# Patient Record
Sex: MALE | Race: WHITE | NOT HISPANIC OR LATINO | Employment: UNEMPLOYED | URBAN - NONMETROPOLITAN AREA
[De-identification: names, ages, dates, MRNs, and addresses within clinical notes are randomized per-mention and may not be internally consistent; named-entity substitution may affect disease eponyms.]

---

## 2017-04-28 ENCOUNTER — APPOINTMENT (OUTPATIENT)
Dept: OCCUPATIONAL MEDICINE | Facility: OTHER | Age: 57
End: 2017-04-28

## 2017-04-28 PROCEDURE — 99000 SPECIMEN HANDLING OFFICE-LAB: CPT

## 2018-02-13 ENCOUNTER — TRANSFERRED RECORDS (OUTPATIENT)
Dept: HEALTH INFORMATION MANAGEMENT | Facility: HOSPITAL | Age: 58
End: 2018-02-13

## 2018-02-14 RX ORDER — FLUTICASONE PROPIONATE 50 MCG
2 SPRAY, SUSPENSION (ML) NASAL DAILY
COMMUNITY
End: 2018-03-16

## 2018-02-15 DIAGNOSIS — H91.93 DECREASED HEARING OF BOTH EARS: Primary | ICD-10-CM

## 2018-02-16 ENCOUNTER — OFFICE VISIT (OUTPATIENT)
Dept: OTOLARYNGOLOGY | Facility: OTHER | Age: 58
End: 2018-02-16
Attending: AUDIOLOGIST
Payer: COMMERCIAL

## 2018-02-16 ENCOUNTER — OFFICE VISIT (OUTPATIENT)
Dept: AUDIOLOGY | Facility: OTHER | Age: 58
End: 2018-02-16
Attending: AUDIOLOGIST
Payer: COMMERCIAL

## 2018-02-16 VITALS
OXYGEN SATURATION: 100 % | TEMPERATURE: 97.4 F | HEIGHT: 66 IN | BODY MASS INDEX: 27.97 KG/M2 | SYSTOLIC BLOOD PRESSURE: 126 MMHG | DIASTOLIC BLOOD PRESSURE: 74 MMHG | WEIGHT: 174 LBS | HEART RATE: 69 BPM

## 2018-02-16 DIAGNOSIS — H90.3 SENSORINEURAL HEARING LOSS, BILATERAL: ICD-10-CM

## 2018-02-16 DIAGNOSIS — J34.89 NASAL SEPTAL SPUR: ICD-10-CM

## 2018-02-16 DIAGNOSIS — H93.13 TINNITUS OF BOTH EARS: ICD-10-CM

## 2018-02-16 DIAGNOSIS — H93.8X3 SENSATION OF FULLNESS IN BOTH EARS: Primary | ICD-10-CM

## 2018-02-16 PROCEDURE — 92504 EAR MICROSCOPY EXAMINATION: CPT | Performed by: PHYSICIAN ASSISTANT

## 2018-02-16 PROCEDURE — 92567 TYMPANOMETRY: CPT | Performed by: AUDIOLOGIST

## 2018-02-16 PROCEDURE — 31231 NASAL ENDOSCOPY DX: CPT | Performed by: PHYSICIAN ASSISTANT

## 2018-02-16 PROCEDURE — 99213 OFFICE O/P EST LOW 20 MIN: CPT | Mod: 25 | Performed by: PHYSICIAN ASSISTANT

## 2018-02-16 PROCEDURE — 92557 COMPREHENSIVE HEARING TEST: CPT | Performed by: AUDIOLOGIST

## 2018-02-16 ASSESSMENT — PAIN SCALES - GENERAL: PAINLEVEL: SEVERE PAIN (6)

## 2018-02-16 NOTE — LETTER
2/16/2018         RE: Zhen Warner  [address] [place] [address]        Dear Colleague,    Thank you for referring your patient, Zhen Warner, to the [facility]. Please see a copy of my visit note below.    Chief Complaint   Patient presents with     Consult     Ear Pain and Fullness; Referred by Milton Sterling     Zhen has no mabel otalgia, but reports pressure. He has intermittent pressure for a few days, would pass, then persist for longer. He has headphones at work, and notes he has increase pressure with headset. He describes vice like symptoms, fullness in ears.   He recently started Flonase, and noticed some improvement since starting.     He has fluttering in his ears, bilateral. He has constant irritation.   No concerns with hearing.   Tinnitus is worsening w/ plugged feeling. Symptoms several years ago similar about 10-12 years ago. He felt symptoms subsided on its own.   Zhen has no otalgia.   Denies otalgia. C/o aural fullness. Left > Right.   No otorrhea.   No recent OM. No COM. No otologic surgeries.   No vertigo.   No facial pain, pressure. No concerns with rhinitis, post nasal drainage.   No seasonal changes.   No concerns reflux, heartburn.   He did in past, tried to lower sodium in his diet.     No tension headaches. NO headaches. Hx of concussion, 3 years ago.     Patient was seen in past by Dr. lBanc, reduced sodium intake and symptoms resolved.       Reviewed audiogram with pateint  Type A tympanograms.   Thresholds are overall stable. Slight decrease at 4 kHz, mild.     Past Medical History:   Diagnosis Date     Medial epicondylitis of left elbow      Mononucleosis     at age 33     Other and unspecified hyperlipidemia      Pain in joint, upper arm      Renal calculi      Right lateral epicondylitis       No Known Allergies    Current Outpatient Prescriptions   Medication     fluticasone (FLONASE) 50 MCG/ACT spray     Zolpidem Tartrate (AMBIEN  "PO)     Simvastatin (ZOCOR PO)     Multiple Minerals-Vitamins TABS     aspirin 81 MG tablet     No current facility-administered medications for this visit.         ROS: 10 point ROS neg other than the symptoms noted above in the HPI.      Physical Exam  /74 (Cuff Size: Adult Regular)  Pulse 69  Temp 97.4  F (36.3  C) (Tympanic)  Ht 5' 6\" (1.676 m)  Wt 174 lb (78.9 kg)  SpO2 100%  BMI 28.08 kg/m2  General - The patient is well nourished and well developed, and appears to have good nutritional status.  Alert and oriented to person and place, answers questions and cooperates with examination appropriately.   Head and Face - Normocephalic and atraumatic, with no gross asymmetry noted.  The facial nerve is intact, with strong symmetric movements.  Voice and Breathing - The patient was breathing comfortably without the use of accessory muscles. There was no wheezing, stridor, or stertor.  The patients voice was clear and strong, and had appropriate pitch and quality.  Ears -The external auditory canals are patent, the tympanic membranes are intact without effusion, retraction or mass.  Bony landmarks are intact. Ears examined under microscopy. No effusion.   Eyes - Extraocular movements intact, and the pupils were reactive to light.  Sclera were not icteric or injected, conjunctiva were pink and moist.  Mouth - Examination of the oral cavity showed pink, healthy oral mucosa. No lesions or ulcerations noted.  The tongue was mobile and midline, and the dentition were in good condition.    Throat - The walls of the oropharynx were smooth, pink, moist, symmetric, and had no lesions or ulcerations.  The tonsillar pillars and soft palate were symmetric.  The uvula was midline on elevation.    Neck - Normal midline excursion of the laryngotracheal complex during swallowing.  Full range of motion on passive movement.  Palpation of the occipital, submental, submandibular, internal jugular chain, and supraclavicular " nodes did not demonstrate any abnormal lymph nodes or masses.  Palpation of the thyroid was soft and smooth, with no nodules or goiter appreciated.  The trachea was mobile and midline.  Nose - External contour is symmetric, no gross deflection or scars.  Nasal mucosa is pink and moist with no abnormal mucus.  The septum and turbinates were evaluated: DNS, Septal spur      To further evaluate the nasal cavity, I performed rigid nasal endoscopy.  I first sprayed the nasal cavity bilaterally with a mix of lidocaine and neosynephrine.  I then began on the left side using a 2.7mm, 30 degree rigid nasal endoscope.  The septum was deviated, large nasal septal spur with contact along MT.   No abnormal secretions, purulence, or polyps were noted. The left middle turbinate and middle meatus were clearly visualized and normal in appearance.  Looking up, the olfactory cleft was unobstructed.  Going further back, the sphenoethmoid recess was normal in appearance, with healthy appearing mucosa on the face of the sphenoid.  The nasopharynx was unremarkable, and the eustachian tube opening on this side was unobstructed.    I then turned my attention to the right side.    No abnormal secretions, purulence, polyps were noted.  The right middle turbinate and middle meatus were clearly visualized and normal in appearance.  Looking up, the olfactory cleft was unobstructed.  Going further back the right sphenoethmoid recess was normal in appearance, and eustachian tube opening was unobstructed.  ET appear patent bilaterally.       ASSESSMENT:      ICD-10-CM    1. Sensation of fullness in both ears H93.8X3     vs ETD   2. Tinnitus of both ears H93.13    3. Nasal septal spur J34.89      Increase Flonase 2 sprays to each nostril twice a day for 2 weeks, then use daily  Monitor sodium daily.   Lower sodium intake.     He had improvement following a low sodium diet in past with aural fullness.   Differential, ETD vs. Cochlear Meniere's.      Consider allergy testing?     He agrees with this plan  Recheck in 1 month. May consider ET dilation However he has no retraction, effusion, etc on exam.       Deborah Salgado PA-C  [facility] [place]  [phone]                    Again, thank you for allowing me to participate in the care of your patient.        Sincerely,        Deborah Salgado PA-C

## 2018-02-16 NOTE — MR AVS SNAPSHOT
After Visit Summary   2/16/2018    Zhen Warner    MRN: [id number]           Patient Information     Date Of Birth          1960        Visit Information        Provider Department      2/16/2018 11:30 AM Deborah Salgado PA-C [facility]        Care Instructions    Increase Flonase 2 sprays to each nostril twice a day for 2 weeks, then use daily  Monitor sodium daily.   Lower sodium intake.   Education material provided.       Thank you for allowing APRYL Ferrer and our ENT team to participate in your care.  If your medications are too expensive, please give the nurse a call.  We can possibly change this medication.  If you have a scheduling or an appointment question please contact [facility] Unit Coordinator at their direct line [phone].   ALL nursing questions or concerns can be directed to your ENT nurse at: [phone] [facility]              Follow-ups after your visit        Follow-up notes from your care team     Return in about 4 weeks (around 3/16/2018).      Who to contact     If you have questions or need follow up information about today's clinic visit or your schedule please contact [facility] directly at [phone].  Normal or non-critical lab and imaging results will be communicated to you by MyChart, letter or phone within 4 business days after the clinic has received the results. If you do not hear from us within 7 days, please contact the clinic through [employer] or phone. If you have a critical or abnormal lab result, we will notify you by phone as soon as possible.  Submit refill requests through [employer] or call your pharmacy and they will forward the refill request to us. Please allow 3 business days for your refill to be completed.          Additional Information About Your Visit        MyChart Information     [employer] lets you send messages to your doctor, view your test results, renew your prescriptions, schedule appointments and more. To  "sign up, go to [email or web address] . Click on \"Log in\" on the left side of the screen, which will take you to the Welcome page. Then click on \"Sign up Now\" on the right side of the page.     You will be asked to enter the access code listed below, as well as some personal information. Please follow the directions to create your username and password.     Your access code is: [email or web address]  Expires: 2018  8:27 AM     Your access code will  in 90 days. If you need help or a new code, please call your [place] clinic or [phone].        Care EveryWhere ID     This is your Care EveryWhere ID. This could be used by other organizations to access your [facility] medical records  [id number]        Your Vitals Were     Pulse Temperature Height Pulse Oximetry BMI (Body Mass Index)       69 97.4  F (36.3  C) (Tympanic) 5' 6\" (1.676 m) 100% 28.08 kg/m2        Blood Pressure from Last 3 Encounters:   18 126/74   14 128/72   14 112/70    Weight from Last 3 Encounters:   18 174 lb (78.9 kg)   14 158 lb (71.7 kg)   14 158 lb (71.7 kg)              Today, you had the following     No orders found for display       Primary Care Provider Office Phone # Fax #    Milton Sterling [phone]       [facility] [address] [place] [address]        Equal Access to Services     [facility]: Hadii carla warren hadasho Somahendraali, waaxda luqadaha, qaybta kaalmada tej de la rosa . So [facility] [phone].    ATENCIÓN: Si habla español, tiene a martinez disposición servicios gratuitos de asistencia lingüística. Llame al [phone].    We comply with applicable federal civil rights laws and [place] laws. We do not discriminate on the basis of race, color, national origin, age, disability, sex, sexual orientation, or gender identity.            Thank you!     Thank you for choosing [facility]  for your [facility]. Our goal is always " to provide you with excellent care. Hearing back from our patients is one way we can continue to improve our services. Please take a few minutes to complete the written survey that you may receive in the mail after your visit with us. Thank you!             Your Updated Medication List - Protect others around you: Learn how to safely use, store and throw away your medicines at www.disposemymeds.org.          This list is accurate as of 2/16/18 11:45 AM.  Always use your most recent med list.                   Brand Name Dispense Instructions for use Diagnosis    AMBIEN PO      Take 10 mg by mouth At Bedtime Patient takes 1/2 tab        aspirin 81 MG tablet      Take 81 mg by mouth daily        fluticasone 50 MCG/ACT spray    FLONASE     Spray 2 sprays into both nostrils daily        Multiple Minerals-Vitamins Tabs      Take by mouth daily        ZOCOR PO      Take 40 mg by mouth daily

## 2018-02-16 NOTE — NURSING NOTE
"Chief Complaint   Patient presents with     Consult     Ear Pain and Fullness; Referred by Milton Sterling       Initial /74 (Cuff Size: Adult Regular)  Pulse 69  Temp 97.4  F (36.3  C) (Tympanic)  Ht 5' 6\" (1.676 m)  Wt 174 lb (78.9 kg)  SpO2 100%  BMI 28.08 kg/m2 Estimated body mass index is 28.08 kg/(m^2) as calculated from the following:    Height as of this encounter: 5' 6\" (1.676 m).    Weight as of this encounter: 174 lb (78.9 kg).  Medication Reconciliation: complete   Erna España      "
#[id number] and #[id number] scopes used.  
unsure

## 2018-02-16 NOTE — PATIENT INSTRUCTIONS
Increase Flonase 2 sprays to each nostril twice a day for 2 weeks, then use daily  Monitor sodium daily.   Lower sodium intake.   Education material provided.       Thank you for allowing APRYL Ferrer and our ENT team to participate in your care.  If your medications are too expensive, please give the nurse a call.  We can possibly change this medication.  If you have a scheduling or an appointment question please contact [facility] Unit Coordinator at their direct line [phone].   ALL nursing questions or concerns can be directed to your ENT nurse at: [phone] [place]

## 2018-02-16 NOTE — PROGRESS NOTES
Chief Complaint   Patient presents with     Consult     Ear Pain and Fullness; Referred by Milton Sterling     Zhen has no mabel otalgia, but reports pressure. He has intermittent pressure for a few days, would pass, then persist for longer. He has headphones at work, and notes he has increase pressure with headset. He describes vice like symptoms, fullness in ears.   He recently started Flonase, and noticed some improvement since starting.     He has fluttering in his ears, bilateral. He has constant irritation.   No concerns with hearing.   Tinnitus is worsening w/ plugged feeling. Symptoms several years ago similar about 10-12 years ago. He felt symptoms subsided on its own.   Zhen has no otalgia.   Denies otalgia. C/o aural fullness. Left > Right.   No otorrhea.   No recent OM. No COM. No otologic surgeries.   No vertigo.   No facial pain, pressure. No concerns with rhinitis, post nasal drainage.   No seasonal changes.   No concerns reflux, heartburn.   He did in past, tried to lower sodium in his diet.     No tension headaches. NO headaches. Hx of concussion, 3 years ago.     Patient was seen in past by Dr. Blanc, reduced sodium intake and symptoms resolved.       Reviewed audiogram with pateint  Type A tympanograms.   Thresholds are overall stable. Slight decrease at 4 kHz, mild.     Past Medical History:   Diagnosis Date     Medial epicondylitis of left elbow      Mononucleosis     at age 33     Other and unspecified hyperlipidemia      Pain in joint, upper arm      Renal calculi      Right lateral epicondylitis       No Known Allergies    Current Outpatient Prescriptions   Medication     fluticasone (FLONASE) 50 MCG/ACT spray     Zolpidem Tartrate (AMBIEN PO)     Simvastatin (ZOCOR PO)     Multiple Minerals-Vitamins TABS     aspirin 81 MG tablet     No current facility-administered medications for this visit.         ROS: 10 point ROS neg other than the symptoms noted above in the  "HPI.      Physical Exam  /74 (Cuff Size: Adult Regular)  Pulse 69  Temp 97.4  F (36.3  C) (Tympanic)  Ht 5' 6\" (1.676 m)  Wt 174 lb (78.9 kg)  SpO2 100%  BMI 28.08 kg/m2  General - The patient is well nourished and well developed, and appears to have good nutritional status.  Alert and oriented to person and place, answers questions and cooperates with examination appropriately.   Head and Face - Normocephalic and atraumatic, with no gross asymmetry noted.  The facial nerve is intact, with strong symmetric movements.  Voice and Breathing - The patient was breathing comfortably without the use of accessory muscles. There was no wheezing, stridor, or stertor.  The patients voice was clear and strong, and had appropriate pitch and quality.  Ears -The external auditory canals are patent, the tympanic membranes are intact without effusion, retraction or mass.  Bony landmarks are intact. Ears examined under microscopy. No effusion.   Eyes - Extraocular movements intact, and the pupils were reactive to light.  Sclera were not icteric or injected, conjunctiva were pink and moist.  Mouth - Examination of the oral cavity showed pink, healthy oral mucosa. No lesions or ulcerations noted.  The tongue was mobile and midline, and the dentition were in good condition.    Throat - The walls of the oropharynx were smooth, pink, moist, symmetric, and had no lesions or ulcerations.  The tonsillar pillars and soft palate were symmetric.  The uvula was midline on elevation.    Neck - Normal midline excursion of the laryngotracheal complex during swallowing.  Full range of motion on passive movement.  Palpation of the occipital, submental, submandibular, internal jugular chain, and supraclavicular nodes did not demonstrate any abnormal lymph nodes or masses.  Palpation of the thyroid was soft and smooth, with no nodules or goiter appreciated.  The trachea was mobile and midline.  Nose - External contour is symmetric, no gross " deflection or scars.  Nasal mucosa is pink and moist with no abnormal mucus.  The septum and turbinates were evaluated: DNS, Septal spur      To further evaluate the nasal cavity, I performed rigid nasal endoscopy.  I first sprayed the nasal cavity bilaterally with a mix of lidocaine and neosynephrine.  I then began on the left side using a 2.7mm, 30 degree rigid nasal endoscope.  The septum was deviated, large nasal septal spur with contact along MT.   No abnormal secretions, purulence, or polyps were noted. The left middle turbinate and middle meatus were clearly visualized and normal in appearance.  Looking up, the olfactory cleft was unobstructed.  Going further back, the sphenoethmoid recess was normal in appearance, with healthy appearing mucosa on the face of the sphenoid.  The nasopharynx was unremarkable, and the eustachian tube opening on this side was unobstructed.    I then turned my attention to the right side.    No abnormal secretions, purulence, polyps were noted.  The right middle turbinate and middle meatus were clearly visualized and normal in appearance.  Looking up, the olfactory cleft was unobstructed.  Going further back the right sphenoethmoid recess was normal in appearance, and eustachian tube opening was unobstructed.  ET appear patent bilaterally.       ASSESSMENT:      ICD-10-CM    1. Sensation of fullness in both ears H93.8X3     vs ETD   2. Tinnitus of both ears H93.13    3. Nasal septal spur J34.89      Increase Flonase 2 sprays to each nostril twice a day for 2 weeks, then use daily  Monitor sodium daily.   Lower sodium intake.     He had improvement following a low sodium diet in past with aural fullness.   Differential, ETD vs. Cochlear Meniere's.     Consider allergy testing?     He agrees with this plan  Recheck in 1 month. May consider ET dilation However he has no retraction, effusion, etc on exam.       Deborah Salgado PA-C  ENT  [facility],  [place]  [phone]

## 2018-02-16 NOTE — PROGRESS NOTES
Audiology Evaluation Completed. Please refer SCANNED AUDIOGRAM and/or TYMPANOGRAM for BACKGROUND, RESULTS, RECOMMENDATIONS.    UNDER RECOMMENDATIONS ON AUDIOGRAM PATIENT REFERRED TO ENT WITH SYMPTOMS      Lindy UNDERWOOD, [facility]  Audiologist #[id number]        NO EPIC REFERRAL/ORDER NEEDED TO ENT BY AUDIOLOGY AS PATIENT ALREADY HAS AN APPOINTMENT WITH ENT

## 2018-02-16 NOTE — MR AVS SNAPSHOT
"              After Visit Summary   2/16/2018    Zhen Warner    MRN: [id number]           Patient Information     Date Of Birth          1960        Visit Information        Provider Department      2/16/2018 8:15 AM Lindy Kirkpatrick AuD [facility]        Today's Diagnoses     Sensorineural hearing loss, bilateral        Tinnitus of both ears           Follow-ups after your visit        Your next 10 appointments already scheduled     Feb 16, 2018 11:30 AM CST   (Arrive by 11:15 AM)   New Visit with Deborah Salgado PA-C   [facility] [place] ([facility] [place] )    [address]  [place] [address]   [phone]              Who to contact     If you have questions or need follow up information about today's clinic visit or your schedule please contact [facility] directly at [phone].  Normal or non-critical lab and imaging results will be communicated to you by MyChart, letter or phone within 4 business days after the clinic has received the results. If you do not hear from us within 7 days, please contact the clinic through MyChart or phone. If you have a critical or abnormal lab result, we will notify you by phone as soon as possible.  Submit refill requests through [employer] or call your pharmacy and they will forward the refill request to us. Please allow 3 business days for your refill to be completed.          Additional Information About Your Visit        MyChart Information     [employer] lets you send messages to your doctor, view your test results, renew your prescriptions, schedule appointments and more. To sign up, go to [email or web address] . Click on \"Log in\" on the left side of the screen, which will take you to the Welcome page. Then click on \"Sign up Now\" on the right side of the page.     You will be asked to enter the access code listed below, as well as some personal information. Please follow the directions to create your username and " password.     Your access code is: [email or web address]  Expires: 2018  8:27 AM     Your access code will  in 90 days. If you need help or a new code, please call your [facility] clinic or [phone].        Care EveryWhere ID     This is your Care EveryWhere ID. This could be used by other organizations to access your [facility] medical records  [id number]         Blood Pressure from Last 3 Encounters:   14 128/72   14 112/70   14 130/79    Weight from Last 3 Encounters:   14 158 lb (71.7 kg)   14 158 lb (71.7 kg)   14 158 lb (71.7 kg)              We Performed the Following     AUDIOGRAM/TYMPANOGRAM - INTERFACE        Primary Care Provider Office Phone # Fax #    Milton Sterling [phone]       [facility] [address] [facility] [address]        Equal Access to Services     [facility]: Hadii aad ku hadasho Soomaali, waaxda luqadaha, qaybta kaalmada adeegyada, waxay idiin hayaan adeeg kharapaxton la'mckenzien . So [facility] [phone].    ATENCIÓN: Si habla español, tiene a martinez disposición servicios gratuitos de asistencia lingüística. Llame al [phone].    We comply with applicable federal civil rights laws and [place] laws. We do not discriminate on the basis of race, color, national origin, age, disability, sex, sexual orientation, or gender identity.            Thank you!     [facility]  [facility]. Our goal is always to provide you with excellent care. Hearing back from our patients is one way we can continue to improve our services. Please take a few minutes to complete the written survey that you may receive in the mail after your visit with us. Thank you!             Your Updated Medication List - Protect others around you: Learn how to safely use, store and throw away your medicines at www.disposemymeds.org.          This list is accurate as of 18  8:27 AM.  Always use your most recent med list.                    Brand Name Dispense Instructions for use Diagnosis    AMBIEN PO      Take 10 mg by mouth At Bedtime Patient takes 1/2 tab        aspirin 81 MG tablet      Take 81 mg by mouth daily        fluticasone 50 MCG/ACT spray    FLONASE     Spray 2 sprays into both nostrils daily        Multiple Minerals-Vitamins Tabs      Take by mouth daily        ZOCOR PO      Take 40 mg by mouth daily

## 2018-03-01 ENCOUNTER — TELEPHONE (OUTPATIENT)
Dept: OTOLARYNGOLOGY | Facility: OTHER | Age: 58
End: 2018-03-01

## 2018-03-01 NOTE — TELEPHONE ENCOUNTER
Pt brought in an accomodation request form to be filled out.  Per Deborah, pt may try a new headset at work and can see Otology.  I left a message for pt to call back to see where we could send the letter or prescription for the headset.  If this does not help, he can see Otology.

## 2018-03-02 NOTE — TELEPHONE ENCOUNTER
The paperwork was filled out and faxed to the number listed.  The original is with the  for the pt to .  Pt was notified.  It was instructed on the paperwork that pt will need a new headset.

## 2018-03-16 ENCOUNTER — OFFICE VISIT (OUTPATIENT)
Dept: OTOLARYNGOLOGY | Facility: OTHER | Age: 58
End: 2018-03-16
Attending: PHYSICIAN ASSISTANT
Payer: COMMERCIAL

## 2018-03-16 VITALS
TEMPERATURE: 96.4 F | SYSTOLIC BLOOD PRESSURE: 110 MMHG | DIASTOLIC BLOOD PRESSURE: 64 MMHG | HEART RATE: 71 BPM | OXYGEN SATURATION: 99 % | HEIGHT: 66 IN | WEIGHT: 170 LBS | RESPIRATION RATE: 16 BRPM | BODY MASS INDEX: 27.32 KG/M2

## 2018-03-16 DIAGNOSIS — H93.8X3 SENSATION OF FULLNESS IN BOTH EARS: Primary | ICD-10-CM

## 2018-03-16 DIAGNOSIS — J34.89 NASAL SEPTAL SPUR: ICD-10-CM

## 2018-03-16 DIAGNOSIS — H93.13 TINNITUS OF BOTH EARS: ICD-10-CM

## 2018-03-16 PROCEDURE — 99213 OFFICE O/P EST LOW 20 MIN: CPT | Mod: 25 | Performed by: PHYSICIAN ASSISTANT

## 2018-03-16 PROCEDURE — 92504 EAR MICROSCOPY EXAMINATION: CPT | Performed by: PHYSICIAN ASSISTANT

## 2018-03-16 RX ORDER — CETIRIZINE HYDROCHLORIDE 10 MG/1
10 TABLET ORAL EVERY EVENING
Qty: 30 TABLET | Refills: 3 | Status: SHIPPED | OUTPATIENT
Start: 2018-03-16 | End: 2024-09-17

## 2018-03-16 RX ORDER — FLUTICASONE PROPIONATE 50 MCG
2 SPRAY, SUSPENSION (ML) NASAL DAILY
Qty: 1 BOTTLE | Refills: 3 | Status: SHIPPED | OUTPATIENT
Start: 2018-03-16 | End: 2024-09-17

## 2018-03-16 ASSESSMENT — PAIN SCALES - GENERAL: PAINLEVEL: NO PAIN (0)

## 2018-03-16 NOTE — LETTER
3/16/2018         RE: Zhen Warner  [address] [place] [address]        Dear Colleague,    Thank you for referring your patient, Zhen Warner, [facility]. Please see a copy of my visit note below.    Chief Complaint   Patient presents with     Ear Problem     F/U sensation of fullness in Bilateral ears, tinnitus and nasal septal spur       Zhen returns for follow up from his 2/16/18 visit. At that visit, he was c/o aural fullness, sensation of fullness in ears. He felt worse with his headset on for work. We did order a new headset for him, completed forms. Since starting use of new headset at work and he has not received it today.   He has changed his headset with some relief.     He has been using daily Flonase, and notes no significant change. He feels his ear remains about 80% from what it was last time.   Zhen has been monitoring his sodium intake as well, and reports there has been some improvement.   He notes overall symptoms are improved at home without concerns. He has mild left ear fluttering and at times wakes him up at night.     Zhen has no mabel otalgia, but reports pressure. He has intermittent pressure for a few days, would pass, then persist for longer. He has headphones at work, and notes he has increase pressure with headset. He describes vice like symptoms, fullness in ears.     He has fluttering in his ears, bilateral. He has constant irritation.   No concerns with hearing.   Tinnitus is worsening w/ plugged feeling. Symptoms several years ago similar about 10-12 years ago. He felt symptoms subsided on its own.   Zhen has no otalgia.   Denies otalgia. C/o aural fullness. Left > Right.   No otorrhea.   No recent OM. No COM. No otologic surgeries.   No vertigo.   No facial pain, pressure. No concerns with rhinitis, post nasal drainage.   No seasonal changes.   No concerns reflux, heartburn.   He did in past, tried to lower sodium in his  "diet.      No tension headaches. NO headaches. Hx of concussion, 3 years ago.      Patient was seen in past by Dr. Blanc, reduced sodium intake and symptoms resolved.         Reviewed audiogram with pateint  Type A tympanograms.   Thresholds are overall stable. Slight decrease at 4 kHz, mild.   Past Medical History:   Diagnosis Date     Medial epicondylitis of left elbow      Mononucleosis     at age 33     Other and unspecified hyperlipidemia      Pain in joint, upper arm      Renal calculi      Right lateral epicondylitis       No Known Allergies  Current Outpatient Prescriptions   Medication     fluticasone (FLONASE) 50 MCG/ACT spray     Zolpidem Tartrate (AMBIEN PO)     Simvastatin (ZOCOR PO)     Multiple Minerals-Vitamins TABS     aspirin 81 MG tablet     No current facility-administered medications for this visit.       ROS: 10 point ROS neg other than the symptoms noted above in the HPI.    /64 (BP Location: Right arm, Patient Position: Sitting, Cuff Size: Adult Large)  Pulse 71  Temp 96.4  F (35.8  C) (Tympanic)  Resp 16  Ht 5' 6\" (1.676 m)  Wt 170 lb (77.1 kg)  SpO2 99%  BMI 27.44 kg/m2    General - The patient is well nourished and well developed, and appears to have good nutritional status.  Alert and oriented to person and place, answers questions and cooperates with examination appropriately.   Head and Face - Normocephalic and atraumatic, with no gross asymmetry noted.  The facial nerve is intact, with strong symmetric movements.  Voice and Breathing - The patient was breathing comfortably without the use of accessory muscles. There was no wheezing, stridor, or stertor.  The patients voice was clear and strong, and had appropriate pitch and quality.  Ears -The external auditory canals are patent, the tympanic membranes are intact without effusion, retraction or mass.  Bony landmarks are intact. Ears examined under microscopy. No effusion.   Eyes - Extraocular movements intact, and the " pupils were reactive to light.  Sclera were not icteric or injected, conjunctiva were pink and moist.  Mouth - Examination of the oral cavity showed pink, healthy oral mucosa. No lesions or ulcerations noted.  The tongue was mobile and midline, and the dentition were in good condition.    Throat - The walls of the oropharynx were smooth, pink, moist, symmetric, and had no lesions or ulcerations.  The tonsillar pillars and soft palate were symmetric.  The uvula was midline on elevation.    Neck - Normal midline excursion of the laryngotracheal complex during swallowing.  Full range of motion on passive movement.  Palpation of the occipital, submental, submandibular, internal jugular chain, and supraclavicular nodes did not demonstrate any abnormal lymph nodes or masses.  Palpation of the thyroid was soft and smooth, with no nodules or goiter appreciated.  The trachea was mobile and midline.  Nose - External contour is symmetric, no gross deflection or scars.  Nasal mucosa is pink and moist with no abnormal mucus.  The septum and turbinates were evaluated: DNS, Septal spur        ET patent at his last visit.       ASSESSMENT:    ICD-10-CM    1. Sensation of fullness in both ears H93.8X3 cetirizine (ZYRTEC) 10 MG tablet     fluticasone (FLONASE) 50 MCG/ACT spray   2. Tinnitus of both ears H93.13    3. Nasal septal spur J34.89      Refilled Flonase. Use Flonase before and after flight-   Obtain new headset. Hopefully this will help with his symptoms while at work.     Continue with low sodium diet.   Start Zyrtec one tablet at night.   If no improvement, consider diuretic trial   Discussed imaging with patient, however, no vertigo, otalgia, fluctuating HL, ASNHL noted.   If worsening symptoms, consider imaging. He agrees with this plan.   He had improvement following a low sodium diet in past with aural fullness.   Differential, ETD vs. Cochlear Meniere's.          Deborah Salgado PA-C  ENT  [facility],  [place]  [phone]      Again, thank you for allowing me to participate in the care of your patient.        Sincerely,        Deborah Salgado PA-C

## 2018-03-16 NOTE — NURSING NOTE
"Chief Complaint   Patient presents with     Ear Problem     F/U sensation of fullness in Bilateral ears, tinnitus and nasal septal spur       Initial /64 (BP Location: Right arm, Patient Position: Sitting, Cuff Size: Adult Large)  Pulse 71  Temp 96.4  F (35.8  C) (Tympanic)  Resp 16  Ht 5' 6\" (1.676 m)  Wt 170 lb (77.1 kg)  SpO2 99%  BMI 27.44 kg/m2 Estimated body mass index is 27.44 kg/(m^2) as calculated from the following:    Height as of this encounter: 5' 6\" (1.676 m).    Weight as of this encounter: 170 lb (77.1 kg).  Medication Reconciliation: marc GREEN      "

## 2018-03-16 NOTE — MR AVS SNAPSHOT
After Visit Summary   3/16/2018    Zhen Warner    MRN: [id number]           Patient Information     Date Of Birth          1960        Visit Information        Provider Department      3/16/2018 8:15 AM Deborah Salgado PA-[facility]        Today's Diagnoses     Sensation of fullness in both ears    -  1    Tinnitus of both ears        Nasal septal spur          Care Instructions    Refilled Flonase. Use Flonase before and after flight-   Obtain new headset.   Continue with low sodium diet.   Start Zyrtec one tablet at night.   If no improvement, consider diuretic trial     Thank you for allowing APRYL Ferrer and our ENT team to participate in your care.  If your medications are too expensive, please give the nurse a call.  We can possibly change this medication.  If you have a scheduling or an appointment question please contact [facility] Unit Coordinator at their direct line [phone].   ALL nursing questions or concerns can be directed to your ENT nurse at: [phone] Caterina              Follow-ups after your visit        Who to contact     If you have questions or need follow up information about today's clinic visit or your schedule please contact [facility] directly at [phone].  Normal or non-critical lab and imaging results will be communicated to you by MyChart, letter or phone within 4 business days after the clinic has received the results. If you do not hear from us within 7 days, please contact the clinic through [employer] or phone. If you have a critical or abnormal lab result, we will notify you by phone as soon as possible.  Submit refill requests through [facility] or call your pharmacy and they will forward the refill request to us. Please allow 3 business days for your refill to be completed.          Additional Information About Your Visit        [employer] Information     [facility] lets you send messages to your doctor, view your test  "results, renew your prescriptions, schedule appointments and more. To sign up, go to [email or web address] . Click on \"Log in\" on the left side of the screen, which will take you to the Welcome page. Then click on \"Sign up Now\" on the right side of the page.     You will be asked to enter the access code listed below, as well as some personal information. Please follow the directions to create your username and password.     Your access code is: [email or web address]  Expires: 2018  9:27 AM     Your access code will  in 90 days. If you need help or a new code, please call your [facility] clinic or [phone].        Care EveryWhere ID     This is your Care EveryWhere ID. This could be used by other organizations to access your [facility] medical records  [id number]        Your Vitals Were     Pulse Temperature Respirations Height Pulse Oximetry BMI (Body Mass Index)    71 96.4  F (35.8  C) (Tympanic) 16 5' 6\" (1.676 m) 99% 27.44 kg/m2       Blood Pressure from Last 3 Encounters:   18 110/64   18 126/74   14 128/72    Weight from Last 3 Encounters:   18 170 lb (77.1 kg)   18 174 lb (78.9 kg)   14 158 lb (71.7 kg)              Today, you had the following     No orders found for display         Today's Medication Changes          These changes are accurate as of 3/16/18  8:28 AM.  If you have any questions, ask your nurse or doctor.               Start taking these medicines.        Dose/Directions    cetirizine 10 MG tablet   Commonly known as:  zyrTEC   Used for:  Sensation of fullness in both ears   Started by:  Deborah Salgado PA-C        Dose:  10 mg   Take 1 tablet (10 mg) by mouth every evening   Quantity:  30 tablet   Refills:  3            Where to get your medicines      These medications were sent to [id number] - [place] - [address]  [address] [place] [address]     Phone:  [phone]     cetirizine 10 MG tablet    fluticasone 50 MCG/ACT " spray                Primary Care Provider Office Phone # Fax #    [place] [phone]       [facility] [address] [facility] [address]        Equal Access to Services     SHADIKATHERYN GREG : Hadii carla warren que [address], wayosida luqadaha, qaybta kaalmada rj, tej toney laCamilleeric martinez. So [facility] [phone].    ATENCIÓN: Si habla español, tiene a martinez disposición servicios gratuitos de asistencia lingüística. Llame al [phone].    We comply with applicable federal civil rights laws and [place] laws. We do not discriminate on the basis of race, color, national origin, age, disability, sex, sexual orientation, or gender identity.            Thank you!     Thank you for choosing [facility]  for your care. Our goal is always to provide you with excellent care. Hearing back from our patients is one way we can continue to improve our services. Please take a few minutes to complete the written survey that you may receive in the mail after your visit with us. Thank you!             Your Updated Medication List - Protect others around you: Learn how to safely use, store and throw away your medicines at www.disposemymeds.org.          This list is accurate as of 3/16/18  8:28 AM.  Always use your most recent med list.                   Brand Name Dispense Instructions for use Diagnosis    aspirin 81 MG tablet      Take 81 mg by mouth daily        cetirizine 10 MG tablet    zyrTEC    30 tablet    Take 1 tablet (10 mg) by mouth every evening    Sensation of fullness in both ears       fluticasone 50 MCG/ACT spray    FLONASE    1 Bottle    Spray 2 sprays into both nostrils daily    Sensation of fullness in both ears       Multiple Minerals-Vitamins Tabs      Take by mouth daily        ZOCOR PO      Take 40 mg by mouth daily

## 2018-03-16 NOTE — PATIENT INSTRUCTIONS
Refilled Flonase. Use Flonase before and after flight-   Obtain new headset.   Continue with low sodium diet.   Start Zyrtec one tablet at night.   If no improvement, consider diuretic trial     Thank you for allowing APRYL Ferrer and our ENT team to participate in your care.  If your medications are too expensive, please give the nurse a call.  We can possibly change this medication.  If you have a scheduling or an appointment question please contact [facility] Unit Coordinator at their direct line [phone].   ALL nursing questions or concerns can be directed to your ENT nurse at: [phone] Caterina      
Elizabeth Quiroga  (PA)  2018 23:52:12

## 2018-03-16 NOTE — PROGRESS NOTES
Chief Complaint   Patient presents with     Ear Problem     F/U sensation of fullness in Bilateral ears, tinnitus and nasal septal spur       Zhen returns for follow up from his 2/16/18 visit. At that visit, he was c/o aural fullness, sensation of fullness in ears. He felt worse with his headset on for work. We did order a new headset for him, completed forms. Since starting use of new headset at work and he has not received it today.   He has changed his headset with some relief.     He has been using daily Flonase, and notes no significant change. He feels his ear remains about 80% from what it was last time.   Zhen has been monitoring his sodium intake as well, and reports there has been some improvement.   He notes overall symptoms are improved at home without concerns. He has mild left ear fluttering and at times wakes him up at night.     Zhen has no mabel otalgia, but reports pressure. He has intermittent pressure for a few days, would pass, then persist for longer. He has headphones at work, and notes he has increase pressure with headset. He describes vice like symptoms, fullness in ears.     He has fluttering in his ears, bilateral. He has constant irritation.   No concerns with hearing.   Tinnitus is worsening w/ plugged feeling. Symptoms several years ago similar about 10-12 years ago. He felt symptoms subsided on its own.   Zhen has no otalgia.   Denies otalgia. C/o aural fullness. Left > Right.   No otorrhea.   No recent OM. No COM. No otologic surgeries.   No vertigo.   No facial pain, pressure. No concerns with rhinitis, post nasal drainage.   No seasonal changes.   No concerns reflux, heartburn.   He did in past, tried to lower sodium in his diet.      No tension headaches. NO headaches. Hx of concussion, 3 years ago.      Patient was seen in past by Dr. Blanc, reduced sodium intake and symptoms resolved.         Reviewed audiogram with pateint  Type A tympanograms.   Thresholds are  "overall stable. Slight decrease at 4 kHz, mild.   Past Medical History:   Diagnosis Date     Medial epicondylitis of left elbow      Mononucleosis     at age 33     Other and unspecified hyperlipidemia      Pain in joint, upper arm      Renal calculi      Right lateral epicondylitis       No Known Allergies  Current Outpatient Prescriptions   Medication     fluticasone (FLONASE) 50 MCG/ACT spray     Zolpidem Tartrate (AMBIEN PO)     Simvastatin (ZOCOR PO)     Multiple Minerals-Vitamins TABS     aspirin 81 MG tablet     No current facility-administered medications for this visit.       ROS: 10 point ROS neg other than the symptoms noted above in the HPI.    /64 (BP Location: Right arm, Patient Position: Sitting, Cuff Size: Adult Large)  Pulse 71  Temp 96.4  F (35.8  C) (Tympanic)  Resp 16  Ht 5' 6\" (1.676 m)  Wt 170 lb (77.1 kg)  SpO2 99%  BMI 27.44 kg/m2    General - The patient is well nourished and well developed, and appears to have good nutritional status.  Alert and oriented to person and place, answers questions and cooperates with examination appropriately.   Head and Face - Normocephalic and atraumatic, with no gross asymmetry noted.  The facial nerve is intact, with strong symmetric movements.  Voice and Breathing - The patient was breathing comfortably without the use of accessory muscles. There was no wheezing, stridor, or stertor.  The patients voice was clear and strong, and had appropriate pitch and quality.  Ears -The external auditory canals are patent, the tympanic membranes are intact without effusion, retraction or mass.  Bony landmarks are intact. Ears examined under microscopy. No effusion.   Eyes - Extraocular movements intact, and the pupils were reactive to light.  Sclera were not icteric or injected, conjunctiva were pink and moist.  Mouth - Examination of the oral cavity showed pink, healthy oral mucosa. No lesions or ulcerations noted.  The tongue was mobile and midline, and " the dentition were in good condition.    Throat - The walls of the oropharynx were smooth, pink, moist, symmetric, and had no lesions or ulcerations.  The tonsillar pillars and soft palate were symmetric.  The uvula was midline on elevation.    Neck - Normal midline excursion of the laryngotracheal complex during swallowing.  Full range of motion on passive movement.  Palpation of the occipital, submental, submandibular, internal jugular chain, and supraclavicular nodes did not demonstrate any abnormal lymph nodes or masses.  Palpation of the thyroid was soft and smooth, with no nodules or goiter appreciated.  The trachea was mobile and midline.  Nose - External contour is symmetric, no gross deflection or scars.  Nasal mucosa is pink and moist with no abnormal mucus.  The septum and turbinates were evaluated: DNS, Septal spur        ET patent at his last visit.       ASSESSMENT:    ICD-10-CM    1. Sensation of fullness in both ears H93.8X3 cetirizine (ZYRTEC) 10 MG tablet     fluticasone (FLONASE) 50 MCG/ACT spray   2. Tinnitus of both ears H93.13    3. Nasal septal spur J34.89      Refilled Flonase. Use Flonase before and after flight-   Obtain new headset. Hopefully this will help with his symptoms while at work.     Continue with low sodium diet.   Start Zyrtec one tablet at night.   If no improvement, consider diuretic trial   Discussed imaging with patient, however, no vertigo, otalgia, fluctuating HL, ASNHL noted.   If worsening symptoms, consider imaging. He agrees with this plan.   He had improvement following a low sodium diet in past with aural fullness.   Differential, ETD vs. Cochlear Meniere's.          Deborah Salgado PA-C  ENT  [facility], [place]  [phone]

## 2018-04-04 ENCOUNTER — TELEPHONE (OUTPATIENT)
Dept: OTOLARYNGOLOGY | Facility: OTHER | Age: 58
End: 2018-04-04

## 2018-04-04 DIAGNOSIS — H93.8X3 SENSATION OF FULLNESS IN BOTH EARS: Primary | ICD-10-CM

## 2018-04-04 DIAGNOSIS — H93.13 TINNITUS OF BOTH EARS: ICD-10-CM

## 2018-04-04 NOTE — TELEPHONE ENCOUNTER
I called pt to see how his ears were feeling.  He states that they still feel full.  He would like to try the hctz.  Please send this into [facility] in [place].

## 2018-04-05 RX ORDER — HYDROCHLOROTHIAZIDE 12.5 MG/1
12.5 TABLET ORAL DAILY
Qty: 30 TABLET | Refills: 1 | Status: SHIPPED | OUTPATIENT
Start: 2018-04-05 | End: 2024-09-17

## 2020-08-18 ENCOUNTER — TELEPHONE (OUTPATIENT)
Dept: FAMILY MEDICINE | Facility: OTHER | Age: 60
End: 2020-08-18

## 2020-08-18 ENCOUNTER — RESULTS ONLY (OUTPATIENT)
Dept: LAB | Age: 60
End: 2020-08-18

## 2020-08-18 LAB
D DIMER PPP FEU-MCNC: <0.3 UG/ML FEU (ref 0–0.5)
TROPONIN I SERPL-MCNC: <0.015 UG/L (ref 0–0.04)

## 2020-08-18 NOTE — TELEPHONE ENCOUNTER
Call from Dr. Narvaez's Nurse from [facility] inquiring on D Dimer and Troponin results.     Notified Troponin <0.015                D Dimer <0.3

## 2021-06-24 ENCOUNTER — TRANSFERRED RECORDS (OUTPATIENT)
Dept: HEALTH INFORMATION MANAGEMENT | Facility: CLINIC | Age: 61
End: 2021-06-24

## 2021-08-02 ENCOUNTER — TRANSFERRED RECORDS (OUTPATIENT)
Dept: HEALTH INFORMATION MANAGEMENT | Facility: CLINIC | Age: 61
End: 2021-08-02

## 2021-08-24 ENCOUNTER — MEDICAL CORRESPONDENCE (OUTPATIENT)
Dept: INTERVENTIONAL RADIOLOGY/VASCULAR | Facility: HOSPITAL | Age: 61
End: 2021-08-24

## 2021-08-25 ENCOUNTER — MEDICAL CORRESPONDENCE (OUTPATIENT)
Dept: INTERVENTIONAL RADIOLOGY/VASCULAR | Facility: HOSPITAL | Age: 61
End: 2021-08-25

## 2021-08-27 ENCOUNTER — MEDICAL CORRESPONDENCE (OUTPATIENT)
Dept: INTERVENTIONAL RADIOLOGY/VASCULAR | Facility: HOSPITAL | Age: 61
End: 2021-08-27

## 2021-08-31 ENCOUNTER — HOSPITAL ENCOUNTER (OUTPATIENT)
Dept: INTERVENTIONAL RADIOLOGY/VASCULAR | Facility: HOSPITAL | Age: 61
Discharge: HOME | End: 2021-08-31
Attending: NURSE PRACTITIONER | Admitting: RADIOLOGY
Payer: COMMERCIAL

## 2021-08-31 DIAGNOSIS — M47.22 OSTEOARTHRITIS OF SPINE WITH RADICULOPATHY, CERVICAL REGION: ICD-10-CM

## 2021-08-31 PROCEDURE — G0463 HOSPITAL OUTPT CLINIC VISIT: HCPCS

## 2021-09-07 ENCOUNTER — TELEPHONE (OUTPATIENT)
Dept: INTERVENTIONAL RADIOLOGY/VASCULAR | Facility: HOSPITAL | Age: 61
End: 2021-09-07

## 2021-09-07 ENCOUNTER — DOCUMENTATION ONLY (OUTPATIENT)
Dept: INTERVENTIONAL RADIOLOGY/VASCULAR | Facility: HOSPITAL | Age: 61
End: 2021-09-07

## 2021-09-07 NOTE — TELEPHONE ENCOUNTER
Left message for pt regarding upcoming injection, pt must be off ASA for 5 days for upcoming injection

## 2021-09-07 NOTE — PROGRESS NOTES
Patient returned call and updated that he will have to hold his ASA for 5 days prior to his injection on 09/15.  Patient in understanding.  No further questions.

## 2021-09-15 ENCOUNTER — HOSPITAL ENCOUNTER (OUTPATIENT)
Facility: HOSPITAL | Age: 61
Discharge: HOME | End: 2021-09-15
Attending: RADIOLOGY | Admitting: RADIOLOGY
Payer: COMMERCIAL

## 2021-09-15 ENCOUNTER — HOSPITAL ENCOUNTER (OUTPATIENT)
Dept: INTERVENTIONAL RADIOLOGY/VASCULAR | Facility: HOSPITAL | Age: 61
End: 2021-09-15
Attending: NURSE PRACTITIONER
Payer: COMMERCIAL

## 2021-09-15 DIAGNOSIS — M47.812 CERVICAL SPONDYLOSIS: ICD-10-CM

## 2021-09-15 PROCEDURE — 250N000011 HC RX IP 250 OP 636: Performed by: RADIOLOGY

## 2021-09-15 PROCEDURE — 62321 NJX INTERLAMINAR CRV/THRC: CPT

## 2021-09-15 RX ORDER — METHYLPREDNISOLONE ACETATE 80 MG/ML
80 INJECTION, SUSPENSION INTRA-ARTICULAR; INTRALESIONAL; INTRAMUSCULAR; SOFT TISSUE ONCE
Status: COMPLETED | OUTPATIENT
Start: 2021-09-15 | End: 2021-09-15

## 2021-09-15 RX ORDER — IOPAMIDOL 612 MG/ML
15 INJECTION, SOLUTION INTRATHECAL ONCE
Status: COMPLETED | OUTPATIENT
Start: 2021-09-15 | End: 2021-09-15

## 2021-09-15 RX ADMIN — METHYLPREDNISOLONE ACETATE 80 MG: 80 INJECTION, SUSPENSION INTRA-ARTICULAR; INTRALESIONAL; INTRAMUSCULAR; SOFT TISSUE at 10:59

## 2021-09-15 RX ADMIN — IOPAMIDOL 6 ML: 612 INJECTION, SOLUTION INTRATHECAL at 10:59

## 2021-09-15 NOTE — DISCHARGE INSTRUCTIONS
Cell number on file:    Telephone Information:   Mobile [phone]     Is it ok to leave a message at this number(s)? Yes    Dr. Hand completed your procedure on 9/15/2021.    Current Pain Level (0-10 Scale): 2/10  Post Pain Level (0-10):  2/10    Radiology Discharge instructions for Steroid Injection    Activity Level:     Do not do any heavy activity or exercise for 24 hours.   Do not drive for 4 hours after your injection.  Diet:   Return to your normal diet.  Medications:   If you have stopped taking your Aspirin, Coumadin/Warfarin, Ibuprofen, or any   other blood thinner for this procedure you may resume in the morning unless   your primary care provider has given you other instructions.    Diabetics may see an increase in blood sugar after steroid injections. If you are concerned about your blood sugar, please contact your family doctor.    Site Care:  Remove the bandage and bathe or shower the morning after the procedure.      This is a Pain Management procedure.  You will be contacted in two weeks for follow up.    Call your Primary Care Provider if you have the following (if your primary care provider is not available please seek emergency care):   Nausea with vomiting   Severe headache   Drowsiness or confusion   Redness or drainage at the injection or puncture site   Temperature over 101 degrees F   Other concerns   Worsening back pain   Stiff neck

## 2021-09-29 ENCOUNTER — TELEPHONE (OUTPATIENT)
Dept: INTERVENTIONAL RADIOLOGY/VASCULAR | Facility: HOSPITAL | Age: 61
End: 2021-09-29

## 2021-09-29 NOTE — TELEPHONE ENCOUNTER
CONSULT PATIENT  PAIN INJECTION POST CALL    Procedure: Epidural TL C7-T1  Radiologist(s): Dr. Zhen Hand  Date of Procedure: 9/15/21    I responded to the patient's questions/concerns.    Relief of pain from this injection    A = 90%  A- = 85%  B = 80%  B- =75%  C = 70%  C- = 65%  D = 60%  D- = 50%  F = less than 50%       Would you say this injection has been beneficial? Hard to say, patient experienced a few odd symptoms  If yes, for how long? Helped currently relieve 100% of left side chest/shoulder blade numbness, relieved neck discomfort for two weeks and still is helping that some what.     Was there one injection that worked better than the other? This is patients first injection    Where is the pain? Not really pain, patient describes it to be numbness and tingling. Prior to injection patient was experiencing numbness and tingling down the outside of the left arm, index finger and thumb. However the injection fully relieved that discomfort 100% up until two days ago. Initially patient never had numbness in the right arm or hand/fingers, After the injection patient began developing numbness in right arm, right hand, and mainly all fingers on right hand. Patient also states he developed numbness at the end of his tongue as if it was burned and has never experienced this before. Right sided numbness is now gone as of two days ago and numbness/tingling is back on the outside of the left arm, index fingers, and thumb.   Can you describe the pain? Numbness/tingling  Does the pain radiate anywhere? n/a  If yes, where does it radiate and where does the pain stop?n/a    Is this new pain? Yes: tongue numbness and 2 week time period of right arm/hand/finger numbness. Right sided numbness is gone now and pain is back to the left arm/hand/fingers.    Will speak to radiologist then call patient back with recommendation      Alycia Alvarado

## 2021-09-29 NOTE — TELEPHONE ENCOUNTER
CONSULT PATIENT  PAIN INJECTION POST CALL    Procedure: Epidural TL C7-T1  Radiologist(s): Dr. Zhen Hand  Date of Procedure: 9/15/21    The patient was not available by telephone. Left message for patient to call [phone].    Alycia Alvarado

## 2021-10-04 ENCOUNTER — TELEPHONE (OUTPATIENT)
Dept: INTERVENTIONAL RADIOLOGY/VASCULAR | Facility: HOSPITAL | Age: 61
End: 2021-10-04

## 2021-10-04 NOTE — TELEPHONE ENCOUNTER
Left message informing patient of the recommendation per radiologist and we will put orders in then scheduling will call to schedule. Patient aware to call IR with any further questions.

## 2021-10-04 NOTE — TELEPHONE ENCOUNTER
Patient called and would like to wait and think about injection. Explained recommendation to him and he will call IR if wanting to proceed.

## 2021-10-26 ENCOUNTER — TELEPHONE (OUTPATIENT)
Dept: INTERVENTIONAL RADIOLOGY/VASCULAR | Facility: HOSPITAL | Age: 61
End: 2021-10-26

## 2021-11-16 ENCOUNTER — HOSPITAL ENCOUNTER (OUTPATIENT)
Facility: HOSPITAL | Age: 61
Discharge: HOME | End: 2021-11-16
Attending: RADIOLOGY | Admitting: RADIOLOGY
Payer: COMMERCIAL

## 2021-11-16 ENCOUNTER — HOSPITAL ENCOUNTER (OUTPATIENT)
Dept: INTERVENTIONAL RADIOLOGY/VASCULAR | Facility: HOSPITAL | Age: 61
End: 2021-11-16
Attending: NURSE PRACTITIONER
Payer: COMMERCIAL

## 2021-11-16 DIAGNOSIS — M47.812 CERVICAL SPONDYLOSIS: ICD-10-CM

## 2021-11-16 PROCEDURE — 250N000011 HC RX IP 250 OP 636: Performed by: RADIOLOGY

## 2021-11-16 PROCEDURE — 64479 NJX AA&/STRD TFRM EPI C/T 1: CPT | Mod: LT

## 2021-11-16 RX ORDER — DEXAMETHASONE SODIUM PHOSPHATE 10 MG/ML
10 INJECTION, SOLUTION INTRAMUSCULAR; INTRAVENOUS ONCE
Status: COMPLETED | OUTPATIENT
Start: 2021-11-16 | End: 2021-11-16

## 2021-11-16 RX ORDER — LIDOCAINE HYDROCHLORIDE 10 MG/ML
5 INJECTION, SOLUTION EPIDURAL; INFILTRATION; INTRACAUDAL; PERINEURAL ONCE
Status: DISCONTINUED | OUTPATIENT
Start: 2021-11-16 | End: 2021-11-16 | Stop reason: CLARIF

## 2021-11-16 RX ORDER — IOPAMIDOL 612 MG/ML
15 INJECTION, SOLUTION INTRATHECAL ONCE
Status: COMPLETED | OUTPATIENT
Start: 2021-11-16 | End: 2021-11-16

## 2021-11-16 RX ADMIN — DEXAMETHASONE SODIUM PHOSPHATE 10 MG: 10 INJECTION, SOLUTION INTRAMUSCULAR; INTRAVENOUS at 11:18

## 2021-11-16 RX ADMIN — IOPAMIDOL 2 ML: 612 INJECTION, SOLUTION INTRATHECAL at 11:20

## 2021-11-16 NOTE — DISCHARGE INSTRUCTIONS
Cell number on file:    Telephone Information:   Mobile [phone]     Is it ok to leave a message at this number(s)? Yes    Dr. Hand completed your procedure on 11/16/2021.    Current Pain Level (0-10 Scale): 3/10  Post Pain Level (0-10):  1/10    Radiology Discharge instructions for Steroid Injection    Activity Level:     Do not do any heavy activity or exercise for 24 hours.   Do not drive for 4 hours after your injection.  Diet:   Return to your normal diet.  Medications:   If you have stopped taking your Aspirin, Coumadin/Warfarin, Ibuprofen, or any   other blood thinner for this procedure you may resume in the morning unless   your primary care provider has given you other instructions.    Diabetics may see an increase in blood sugar after steroid injections. If you are concerned about your blood sugar, please contact your family doctor.    Site Care:  Remove the bandage and bathe or shower the morning after the procedure.      This is a Pain Management procedure.  You will be contacted in two weeks for follow up.    Call your Primary Care Provider if you have the following (if your primary care provider is not available please seek emergency care):   Nausea with vomiting   Severe headache   Drowsiness or confusion   Redness or drainage at the injection or puncture site   Temperature over 101 degrees F   Other concerns   Worsening back pain   Stiff neck

## 2021-12-02 ENCOUNTER — TELEPHONE (OUTPATIENT)
Dept: INTERVENTIONAL RADIOLOGY/VASCULAR | Facility: HOSPITAL | Age: 61
End: 2021-12-02
Payer: COMMERCIAL

## 2021-12-02 NOTE — TELEPHONE ENCOUNTER
CONSULT PATIENT  PAIN INJECTION POST CALL    Procedure: Epidural TF Left C5-6  Radiologist(s): Dr. Zhen Hand  Date of Procedure: 11/16/21    The patient was not available by telephone. Left message.    Alycia Alvarado

## 2021-12-06 ENCOUNTER — TELEPHONE (OUTPATIENT)
Dept: INTERVENTIONAL RADIOLOGY/VASCULAR | Facility: HOSPITAL | Age: 61
End: 2021-12-06
Payer: COMMERCIAL

## 2021-12-06 NOTE — TELEPHONE ENCOUNTER
CONSULT PATIENT  PAIN INJECTION POST CALL    Procedure:    Epidural TF Left C5-6  Radiologist(s):        Dr. Zhen Hand  Date of Procedure: 11/16/21    I responded to the patient's questions/concerns.    Relief of pain from this injection    A = 90%  A- = 85%  B = 80%  B- =75%  C = 70%  C- = 65%  D = 60%  D- = 50%  F = less than 50%       Would you say this injection has been beneficial? No  If yes, for how long? 100% relief for two days     Was there one injection that worked better than the other? no    Where is the pain? Left shoulder blade area. Patient states the injection did take away left arm tingling and numbness  Can you describe the pain? Deep constant ache  Does the pain radiate anywhere? no  If yes, where does it radiate and where does the pain stop?n/a    Is this new pain? No    Call with recommendation      Alycia Alvarado

## 2021-12-10 ENCOUNTER — TELEPHONE (OUTPATIENT)
Dept: INTERVENTIONAL RADIOLOGY/VASCULAR | Facility: HOSPITAL | Age: 61
End: 2021-12-10
Payer: COMMERCIAL

## 2021-12-10 NOTE — TELEPHONE ENCOUNTER
Left message to inform patient of recommendation per Radiologist and to call back if wanting to move forward with repeat injection.

## 2023-04-27 ENCOUNTER — HOSPITAL ENCOUNTER (EMERGENCY)
Facility: HOSPITAL | Age: 63
Discharge: HOME | End: 2023-04-27
Attending: PHYSICIAN ASSISTANT | Admitting: PHYSICIAN ASSISTANT
Payer: COMMERCIAL

## 2023-04-27 VITALS
RESPIRATION RATE: 18 BRPM | OXYGEN SATURATION: 100 % | DIASTOLIC BLOOD PRESSURE: 92 MMHG | HEART RATE: 78 BPM | SYSTOLIC BLOOD PRESSURE: 168 MMHG | TEMPERATURE: 97 F

## 2023-04-27 DIAGNOSIS — L08.9 INFECTED LACERATION: ICD-10-CM

## 2023-04-27 DIAGNOSIS — T14.8XXA INFECTED LACERATION: ICD-10-CM

## 2023-04-27 PROCEDURE — 87070 CULTURE OTHR SPECIMN AEROBIC: CPT | Performed by: PHYSICIAN ASSISTANT

## 2023-04-27 PROCEDURE — 99213 OFFICE O/P EST LOW 20 MIN: CPT | Performed by: PHYSICIAN ASSISTANT

## 2023-04-27 PROCEDURE — G0463 HOSPITAL OUTPT CLINIC VISIT: HCPCS

## 2023-04-27 RX ORDER — MUPIROCIN 20 MG/G
OINTMENT TOPICAL 2 TIMES DAILY
Status: DISCONTINUED | OUTPATIENT
Start: 2023-04-27 | End: 2023-04-27

## 2023-04-27 RX ORDER — DOXYCYCLINE HYCLATE 100 MG
100 TABLET ORAL 2 TIMES DAILY
Qty: 14 TABLET | Refills: 0 | Status: SHIPPED | OUTPATIENT
Start: 2023-04-27 | End: 2023-05-04

## 2023-04-27 ASSESSMENT — ACTIVITIES OF DAILY LIVING (ADL): ADLS_ACUITY_SCORE: 33

## 2023-04-27 ASSESSMENT — ENCOUNTER SYMPTOMS
HEADACHES: 0
DIZZINESS: 0
WOUND: 1
FEVER: 0
VOMITING: 0
CONFUSION: 0

## 2023-04-27 NOTE — ED TRIAGE NOTES
Pt reports that he hit his head on Sunday night and needs a wound check and was concerned that he may need stitches.

## 2023-04-27 NOTE — ED PROVIDER NOTES
History     Chief Complaint   Patient presents with     Laceration     HPI  Zhen Warner is a 62 year old male who presents to urgent care for evaluation of laceration above his left eyebrow.  This occurred 4 days ago when he accidentally struck the edge of a wooden kitchen cabinets.  There was no loss of consciousness.  He also struck the left side of his upper nose.  He kept a bandage on until yesterday, when he removed it and thought that it looked slightly worse than at onset.  There has been some increase in swelling through the eyebrow region.  He did use rubbing alcohol on it when the initial injury happened.  Last Tdap 2022 per chart review.  He denies injuries elsewhere.  No known antibiotic allergies.    Allergies:  No Known Allergies    Problem List:    Patient Active Problem List    Diagnosis Date Noted     Tinnitus 06/13/2014     Priority: Medium     Fullness in ear 06/13/2014     Priority: Medium        Past Medical History:    Past Medical History:   Diagnosis Date     Medial epicondylitis of left elbow      Mononucleosis      Other and unspecified hyperlipidemia      Pain in joint, upper arm      Renal calculi      Right lateral epicondylitis        Past Surgical History:    Past Surgical History:   Procedure Laterality Date     APPENDECTOMY  1964     COLONOSCOPY  6/26/2014    Procedure: COLONOSCOPY;  Surgeon: Milton Sterling MD;  Location: [facility]     IR CONSULTATION FOR IR EXAM  8/31/2021       Family History:    Family History   Problem Relation Age of Onset     Cerebrovascular Disease Father      Hypertension Mother      Cerebrovascular Disease Brother      Diabetes Paternal Grandmother        Social History:  Marital Status:  Single [1]  Social History     Tobacco Use     Smoking status: Never     Smokeless tobacco: Never   Substance Use Topics     Alcohol use: Yes     Drug use: No        Medications:    aspirin 81 MG tablet  cetirizine (ZYRTEC) 10 MG tablet  doxycycline hyclate  (VIBRA-TABS) 100 MG tablet  fluticasone (FLONASE) 50 MCG/ACT spray  hydrochlorothiazide 12.5 MG TABS tablet  Multiple Minerals-Vitamins TABS  Simvastatin (ZOCOR PO)          Review of Systems   Constitutional: Negative for fever.   Eyes: Negative for visual disturbance.   Gastrointestinal: Negative for vomiting.   Skin: Positive for wound.   Neurological: Negative for dizziness and headaches.   Psychiatric/Behavioral: Negative for confusion.       Physical Exam   BP: 168/92  Pulse: 78  Temp: 97  F (36.1  C)  Resp: 18  SpO2: 100 %      Physical Exam  Vitals and nursing note reviewed.   Constitutional:       General: He is not in acute distress.     Appearance: Normal appearance. He is not ill-appearing, toxic-appearing or diaphoretic.   HENT:      Head: Laceration present. No raccoon eyes.      Jaw: There is normal jaw occlusion.        Nose: Nose normal.      Mouth/Throat:      Mouth: Mucous membranes are moist.      Pharynx: No oropharyngeal exudate.   Eyes:      Extraocular Movements: Extraocular movements intact.      Pupils: Pupils are equal, round, and reactive to light.   Cardiovascular:      Rate and Rhythm: Normal rate and regular rhythm.   Pulmonary:      Effort: Pulmonary effort is normal. No respiratory distress.      Breath sounds: Normal breath sounds. No wheezing.   Musculoskeletal:      Cervical back: Normal range of motion.   Skin:     General: Skin is warm.   Neurological:      General: No focal deficit present.      Mental Status: He is alert and oriented to person, place, and time.   Psychiatric:         Mood and Affect: Mood normal.         Behavior: Behavior normal.         ED Course                 Procedures              No results found for this or any previous visit (from the past 24 hour(s)).    Medications - No data to display    Assessments & Plan (with Medical Decision Making)     I have reviewed the nursing notes.    I have reviewed the findings, diagnosis, plan and need for follow up  with the patient.    Zhen presented to urgent care for evaluation of laceration sustained several days prior.  Explained to him that unfortunately this was outside of the timeframe for suture repair.  There did appear to be some early signs of infection and upon application of Steri-Strips, there was very small area of white pustular discharge.  Wound culture was obtained.  We will start him on doxycycline for empiric coverage of this pustular infection.  Advise returning with any significant worsening otherwise follow-up with primary care provider as needed.  He was in agreement and understanding of the plan.      Discharge Medication List as of 4/27/2023  3:49 PM      START taking these medications    Details   doxycycline hyclate (VIBRA-TABS) 100 MG tablet Take 1 tablet (100 mg) by mouth 2 times daily for 7 days, Disp-14 tablet, R-0, E-Prescribe             Final diagnoses:   Infected laceration       4/27/2023   [facility]     Yajaira Holcomb PA-C  04/27/23 1856       Yajaira Holcomb PA-C  05/03/23 0930

## 2023-04-27 NOTE — DISCHARGE INSTRUCTIONS
Thank you for coming in! We reviewed that it's a bit too far from the injury to do sutures-- that's okay.     -You can reapply the steristrips once daily, or remove in about 5 days if they do stay on.   -Given the small amount of pus that came out, I will start you on doxycycline twice daily for 7 days. Please see your primary care provider if this is not looking significantly better in about 5 days. Return sooner with any significant worsening    -Applying sunscreen daily will help to limit scarring.     Take care

## 2023-04-27 NOTE — ED TRIAGE NOTES
Pt presents with c/o hitting head on Sunday pt has a laceration above the left eye   Denies any blood disorders or being on blood thinners.  No otc meds taken today

## 2023-05-01 LAB
BACTERIA WND CULT: ABNORMAL
GRAM STAIN RESULT: ABNORMAL
GRAM STAIN RESULT: ABNORMAL

## 2024-01-30 ENCOUNTER — TRANSFERRED RECORDS (OUTPATIENT)
Dept: HEALTH INFORMATION MANAGEMENT | Facility: HOSPITAL | Age: 64
End: 2024-01-30

## 2024-01-30 LAB
ANION GAP SERPL CALC-SCNC: 11 MEQ/L (ref 3–15)
BUN SERPL-MCNC: 20 MG/DL (ref 5–24)
CALCIUM (EXTERNAL): 9.7 MG/DL (ref 8.4–10.5)
CHLORIDE (EXTERNAL): 104 MEQ/L (ref 99–110)
CHOLESTEROL (EXTERNAL): 169 MG/DL (ref 0–200)
CO2 (EXTERNAL): 24 MEQ/L (ref 19–29)
CREATININE (EXTERNAL): 1.54 MG/DL (ref 0.7–1.2)
GFR ESTIMATED (EXTERNAL): 50 ML/MIN/1.73M2
GFR ESTIMATED (IF AFRICAN AMERICAN) (EXTERNAL): ABNORMAL ML/MIN/1.73M2
GLUCOSE (EXTERNAL): 96 MG/DL (ref 70–99)
HDLC SERPL-MCNC: 39 MG/DL
LDL CHOLESTEROL CALCULATED (EXTERNAL): 102 MG/DL (ref 0–100)
NON HDL CHOLESTEROL (EXTERNAL): 130 MG/DL (ref 0–130)
POTASSIUM (EXTERNAL): 4.1 MEQ/L (ref 3.4–5.1)
SODIUM (EXTERNAL): 139 MEQ/L (ref 134–143)
TRIGLYCERIDES (EXTERNAL): 140 MG/DL

## 2024-02-01 ENCOUNTER — TELEPHONE (OUTPATIENT)
Dept: SURGERY | Facility: OTHER | Age: 64
End: 2024-02-01

## 2024-02-01 NOTE — CONFIDENTIAL NOTE
February 1, 2024    Referral received from Dr Concepcion Orr. Left message for patient to return call to schedule appointment with General Surgery Dept for colonoscopy consult.    -Ignacia Wynne on 2/1/2024 at 11:20 AM

## 2024-05-28 PROBLEM — N52.9 ERECTILE DYSFUNCTION, UNSPECIFIED ERECTILE DYSFUNCTION TYPE: Status: ACTIVE | Noted: 2018-08-13

## 2024-05-28 PROBLEM — G47.09 OTHER INSOMNIA: Status: ACTIVE | Noted: 2018-08-13

## 2024-05-28 PROBLEM — M54.6 ACUTE LEFT-SIDED THORACIC BACK PAIN: Status: ACTIVE | Noted: 2021-07-13

## 2024-05-28 PROBLEM — I10 BENIGN ESSENTIAL HTN: Status: ACTIVE | Noted: 2020-12-17

## 2024-05-28 PROBLEM — M79.602 LEFT ARM PAIN: Status: ACTIVE | Noted: 2021-07-13

## 2024-05-28 PROBLEM — M54.2 NECK PAIN: Status: ACTIVE | Noted: 2021-07-13

## 2024-09-17 ENCOUNTER — PREP FOR PROCEDURE (OUTPATIENT)
Dept: SURGERY | Facility: OTHER | Age: 64
End: 2024-09-17

## 2024-09-17 ENCOUNTER — OFFICE VISIT (OUTPATIENT)
Dept: SURGERY | Facility: OTHER | Age: 64
End: 2024-09-17
Attending: NURSE PRACTITIONER
Payer: COMMERCIAL

## 2024-09-17 VITALS
SYSTOLIC BLOOD PRESSURE: 118 MMHG | HEART RATE: 72 BPM | HEIGHT: 66 IN | OXYGEN SATURATION: 98 % | TEMPERATURE: 98.4 F | BODY MASS INDEX: 26.52 KG/M2 | RESPIRATION RATE: 16 BRPM | DIASTOLIC BLOOD PRESSURE: 74 MMHG | WEIGHT: 165 LBS

## 2024-09-17 DIAGNOSIS — R10.84 ABDOMINAL PAIN, GENERALIZED: ICD-10-CM

## 2024-09-17 DIAGNOSIS — Z86.0100 HISTORY OF COLONIC POLYPS: Primary | ICD-10-CM

## 2024-09-17 DIAGNOSIS — Z86.0100 HISTORY OF COLONIC POLYPS: ICD-10-CM

## 2024-09-17 DIAGNOSIS — Z83.719 FAMILY HISTORY OF COLON POLYPS, UNSPECIFIED: ICD-10-CM

## 2024-09-17 DIAGNOSIS — Z83.719 FAMILY HX COLONIC POLYPS: Primary | ICD-10-CM

## 2024-09-17 PROCEDURE — 99203 OFFICE O/P NEW LOW 30 MIN: CPT | Performed by: NURSE PRACTITIONER

## 2024-09-17 PROCEDURE — G0463 HOSPITAL OUTPT CLINIC VISIT: HCPCS

## 2024-09-17 PROCEDURE — G0463 HOSPITAL OUTPT CLINIC VISIT: HCPCS | Mod: 25

## 2024-09-17 RX ORDER — BISACODYL 5 MG/1
5 TABLET, DELAYED RELEASE ORAL DAILY PRN
Qty: 4 TABLET | Refills: 0 | Status: SHIPPED | OUTPATIENT
Start: 2024-09-17

## 2024-09-17 RX ORDER — ZOLPIDEM TARTRATE 10 MG/1
10 TABLET ORAL
COMMUNITY

## 2024-09-17 RX ORDER — LOSARTAN POTASSIUM 25 MG/1
25 TABLET ORAL DAILY
COMMUNITY

## 2024-09-17 RX ORDER — SILDENAFIL 100 MG/1
100 TABLET, FILM COATED ORAL
COMMUNITY
Start: 2024-01-30

## 2024-09-17 ASSESSMENT — PAIN SCALES - GENERAL: PAINLEVEL: NO PAIN (0)

## 2024-09-17 NOTE — PATIENT INSTRUCTIONS
Thank you for allowing Fiordaliza Carbajal CNP and our surgical team to participate in your care. Please call our health unit coordinator at [phone] with scheduling questions or the nurse at [phone] with any other questions or concerns.      You have been scheduled for:  colonoscopy with  on 10/17/24.   You will use golytely bowel prep.  Please see handout for additional instruction.  You will not need a pre-operative appointment with your primary care provider.  You may call [phone] or [phone] with any questions.

## 2024-09-17 NOTE — PROGRESS NOTES
CLINIC NOTE - CONSULT  9/17/2024    Patient:Zhen Warner    Referring Physician:  Dr. Anne Javier    Reason for Referral: History of Colon Polyps, Family History of Colon Polyps ( brother(s)), and intermittent mid lower abdominal pain--after GI bug which is improving.    This is a 64 year old male with a need of a colonoscopy for History of Colon Polyps, Family History of Colon Polyps ( brother(s)), and intermittent mid lower abdominal pain--after GI bug which is improving..     Personal history of colon cancer : NO   Family history of colon cancer : NO   Personal history of polyps : NO   Family history of polyps : YES--in brother   History of Inflammatory Bowel Disease : NO   History of rectal bleeding : NO   Changes in bowel habits : NO   Last colonoscopy : 10 years ago    Past Medical History:  Past Medical History:   Diagnosis Date    Medial epicondylitis of left elbow     Mononucleosis     at age 33    Other and unspecified hyperlipidemia     Pain in joint, upper arm     Renal calculi     Right lateral epicondylitis        Past Surgical History:  Past Surgical History:   Procedure Laterality Date    APPENDECTOMY  1964    COLONOSCOPY  6/26/2014    Procedure: COLONOSCOPY;  Surgeon: Milton Sterling MD;  Location: [facility]    IR CONSULTATION FOR IR EXAM  8/31/2021       Family History History:  Family History   Problem Relation Age of Onset    Cerebrovascular Disease Father     Hypertension Mother     Cerebrovascular Disease Brother     Diabetes Paternal Grandmother        History of Tobacco Use:  History   Smoking Status    Never   Smokeless Tobacco    Never       Current Medications:  Current Outpatient Medications   Medication Sig Dispense Refill    losartan (COZAAR) 25 MG tablet Take 25 mg by mouth daily.      Multiple Minerals-Vitamins TABS Take by mouth daily      sildenafil (VIAGRA) 100 MG tablet Take 100 mg by mouth.      Simvastatin (ZOCOR PO) Take 40 mg by mouth daily      zolpidem (AMBIEN) 10  "MG tablet Take 10 mg by mouth nightly as needed for sleep.         Allergies:  Allergies   Allergen Reactions    Lisinopril Cough, Other (See Comments) and Shortness Of Breath     Nightmare       ROS:  Constitutional: negative  Eyes: negative  Ears, nose, mouth, throat, and face: positive for tinnitus  Respiratory: negative  Cardiovascular: negative  Gastrointestinal: History of Colon Polyps, Family History of Colon Polyps ( brother(s)), and intermittent mid lower abdominal pain--after GI bug which is improving.  Genitourinary:negative  Integument/breast: negative  Hematologic/lymphatic: negative  Musculoskeletal: positive for arthritis/joint pain  Neurological: negative  Behavioral/Psych: positive for occasional marijuana use  Endocrine: negative  Allergic/Immunologic: negative    PHYSICAL EXAM:     Vital signs: /74 (BP Location: Left arm, Cuff Size: Adult Regular)   Pulse 72   Temp 98.4  F (36.9  C)   Resp 16   Ht 1.676 m (5' 6\")   Wt 74.8 kg (165 lb)   SpO2 98%   BMI 26.63 kg/m     BMI: Body mass index is 26.63 kg/m .   General: Normal, healthy, cooperative, in no acute distress, alert   Skin: no rashes   Lungs: clear to auscultation   CV: Regular rate and rhythm    Abdominal: non-distended, soft, non-tender to palpation   Extremities: No cyanosis, clubbing or edema noted bilaterally in Upper and Lower Extremities   Neurological: without deficit    ASSESSMENT:      64 year old male with a need of a colonoscopy for History of Colon Polyps, Family History of Colon Polyps ( brother(s)), and intermittent mid lower abdominal pain--after GI bug which is improving..    PLAN:   A colonoscopy will be scheduled.  The procedure with their risks, benefits and alternatives were explained.  Risks include but are not limited to bleeding, perforation, missing lesions, need for additional procedures, reaction to anesthesia.  All the patients questions were answered.  The patient consents to proceed as planned.     "

## 2024-10-09 ENCOUNTER — ANESTHESIA EVENT (OUTPATIENT)
Dept: SURGERY | Facility: HOSPITAL | Age: 64
End: 2024-10-09
Payer: COMMERCIAL

## 2024-10-09 RX ORDER — HYDRALAZINE HYDROCHLORIDE 20 MG/ML
2.5-5 INJECTION INTRAMUSCULAR; INTRAVENOUS EVERY 10 MIN PRN
Status: CANCELLED | OUTPATIENT
Start: 2024-10-09

## 2024-10-09 RX ORDER — DEXAMETHASONE SODIUM PHOSPHATE 10 MG/ML
4 INJECTION, SOLUTION INTRAMUSCULAR; INTRAVENOUS
Status: CANCELLED | OUTPATIENT
Start: 2024-10-09

## 2024-10-09 RX ORDER — FENTANYL CITRATE 50 UG/ML
50 INJECTION, SOLUTION INTRAMUSCULAR; INTRAVENOUS EVERY 5 MIN PRN
Status: CANCELLED | OUTPATIENT
Start: 2024-10-09

## 2024-10-09 RX ORDER — NALOXONE HYDROCHLORIDE 0.4 MG/ML
0.1 INJECTION, SOLUTION INTRAMUSCULAR; INTRAVENOUS; SUBCUTANEOUS
Status: CANCELLED | OUTPATIENT
Start: 2024-10-09

## 2024-10-09 RX ORDER — ONDANSETRON 4 MG/1
4 TABLET, ORALLY DISINTEGRATING ORAL EVERY 30 MIN PRN
Status: CANCELLED | OUTPATIENT
Start: 2024-10-09

## 2024-10-09 RX ORDER — LABETALOL HYDROCHLORIDE 5 MG/ML
10 INJECTION, SOLUTION INTRAVENOUS
Status: CANCELLED | OUTPATIENT
Start: 2024-10-09

## 2024-10-09 RX ORDER — OMEGA-3/DHA/EPA/FISH OIL 60 MG-90MG
1 CAPSULE ORAL DAILY
COMMUNITY

## 2024-10-09 RX ORDER — ONDANSETRON 2 MG/ML
4 INJECTION INTRAMUSCULAR; INTRAVENOUS EVERY 30 MIN PRN
Status: CANCELLED | OUTPATIENT
Start: 2024-10-09

## 2024-10-09 NOTE — ANESTHESIA PREPROCEDURE EVALUATION
Anesthesia Pre-Procedure Evaluation    Patient: Zhen Warner   MRN: [id number] : 1960        Procedure : Procedure(s):  COLONOSCOPY          Past Medical History:   Diagnosis Date    Medial epicondylitis of left elbow     Mononucleosis     at age 33    Other and unspecified hyperlipidemia     Pain in joint, upper arm     Renal calculi     Right lateral epicondylitis       Past Surgical History:   Procedure Laterality Date    APPENDECTOMY  1964    COLONOSCOPY  2014    Procedure: COLONOSCOPY;  Surgeon: Milton Sterling MD;  Location: [facility]    IR CONSULTATION FOR IR EXAM  2021      Allergies   Allergen Reactions    Lisinopril Cough, Other (See Comments) and Shortness Of Breath     Nightmare      Social History     Tobacco Use    Smoking status: Never    Smokeless tobacco: Never   Substance Use Topics    Alcohol use: Yes      Wt Readings from Last 1 Encounters:   24 74.8 kg (165 lb)        Anesthesia Evaluation   Pt has had prior anesthetic. Type: MAC and General.    No history of anesthetic complications       ROS/MED HX  ENT/Pulmonary:     (+)     EUN risk factors,  hypertension,                                 Neurologic:  - neg neurologic ROS     Cardiovascular:     (+) Dyslipidemia hypertension- -   -  - -                                      METS/Exercise Tolerance: >4 METS    Hematologic:  - neg hematologic  ROS     Musculoskeletal:   (+)  arthritis,             GI/Hepatic:     (+)        bowel prep,            Renal/Genitourinary:     (+)       Nephrolithiasis ,       Endo:  - neg endo ROS     Psychiatric/Substance Use:     (+)     Recreational drug usage: Cannabis (1 week ago).    Infectious Disease:  - neg infectious disease ROS     Malignancy:  - neg malignancy ROS     Other:  - neg other ROS          Physical Exam    Airway        Mallampati: III   TM distance: > 3 FB   Neck ROM: full   Mouth opening: > 3 cm    Respiratory Devices and Support         Dental     Comment: Right  "front tooth    (+) Multiple crowns, permanant bridges      Cardiovascular   cardiovascular exam normal       Rhythm and rate: regular and normal     Pulmonary   pulmonary exam normal        breath sounds clear to auscultation           OUTSIDE LABS:  CBC: No results found for: \"WBC\", \"HGB\", \"HCT\", \"PLT\"  BMP: No results found for: \"NA\", \"POTASSIUM\", \"CHLORIDE\", \"CO2\", \"BUN\", \"CR\", \"GLC\"  COAGS: No results found for: \"PTT\", \"INR\", \"FIBR\"  POC: No results found for: \"BGM\", \"HCG\", \"HCGS\"  HEPATIC: No results found for: \"ALBUMIN\", \"PROTTOTAL\", \"ALT\", \"AST\", \"GGT\", \"ALKPHOS\", \"BILITOTAL\", \"BILIDIRECT\", \"ROLAND\"  OTHER: No results found for: \"PH\", \"LACT\", \"A1C\", \"MARYJANE\", \"PHOS\", \"MAG\", \"LIPASE\", \"AMYLASE\", \"TSH\", \"T4\", \"T3\", \"CRP\", \"SED\"    Anesthesia Plan    ASA Status:  2    NPO Status:  NPO Appropriate    Anesthesia Type: MAC.     - Reason for MAC: straight local not clinically adequate   Induction: Intravenous, Propofol.   Maintenance: Balanced.        Consents    Anesthesia Plan(s) and associated risks, benefits, and realistic alternatives discussed. Questions answered and patient/representative(s) expressed understanding.     - Discussed: Risks, Benefits and Alternatives for BOTH SEDATION and the PROCEDURE were discussed     - Discussed with:  Patient      - Extended Intubation/Ventilatory Support Discussed: No.      - Patient is DNR/DNI Status: No     Use of blood products discussed: No .     Postoperative Care            Comments:    Other Comments: Consult myranda daugherty 9/17/24  GI bug improving 9/17/24    Risks and benefits of MAC anesthetic discussed including dental damage, aspiration, loss of airway, conversion to general anesthetic, CV complications, MI, stroke, death. Pt wishes to proceed.            Zhen Ryan Kniffin, APRN CRNA    I have reviewed the pertinent notes and labs in the chart from the past 30 days and (re)examined the patient.  Any updates or changes from those notes are reflected in this " "note.              # Hypertension: Noted on problem list         # Overweight: Estimated body mass index is 26.63 kg/m  as calculated from the following:    Height as of 9/17/24: 1.676 m (5' 6\").    Weight as of 9/17/24: 74.8 kg (165 lb).             "

## 2024-10-17 ENCOUNTER — ANESTHESIA (OUTPATIENT)
Dept: SURGERY | Facility: HOSPITAL | Age: 64
End: 2024-10-17
Payer: COMMERCIAL

## 2024-10-17 ENCOUNTER — HOSPITAL ENCOUNTER (OUTPATIENT)
Facility: HOSPITAL | Age: 64
Discharge: HOME | End: 2024-10-17
Attending: SURGERY | Admitting: SURGERY
Payer: COMMERCIAL

## 2024-10-17 VITALS
BODY MASS INDEX: 26.84 KG/M2 | HEIGHT: 66 IN | WEIGHT: 167 LBS | OXYGEN SATURATION: 98 % | HEART RATE: 73 BPM | SYSTOLIC BLOOD PRESSURE: 111 MMHG | DIASTOLIC BLOOD PRESSURE: 83 MMHG | RESPIRATION RATE: 16 BRPM | TEMPERATURE: 98 F

## 2024-10-17 DIAGNOSIS — M54.6 ACUTE LEFT-SIDED THORACIC BACK PAIN: ICD-10-CM

## 2024-10-17 DIAGNOSIS — H93.8X3 SENSATION OF FULLNESS IN BOTH EARS: ICD-10-CM

## 2024-10-17 DIAGNOSIS — H93.13 TINNITUS OF BOTH EARS: ICD-10-CM

## 2024-10-17 DIAGNOSIS — E78.2 MIXED HYPERLIPIDEMIA: ICD-10-CM

## 2024-10-17 DIAGNOSIS — M79.602 LEFT ARM PAIN: ICD-10-CM

## 2024-10-17 DIAGNOSIS — G47.09 OTHER INSOMNIA: Primary | ICD-10-CM

## 2024-10-17 DIAGNOSIS — M54.2 NECK PAIN: ICD-10-CM

## 2024-10-17 DIAGNOSIS — I10 BENIGN ESSENTIAL HTN: ICD-10-CM

## 2024-10-17 DIAGNOSIS — N52.9 ERECTILE DYSFUNCTION, UNSPECIFIED ERECTILE DYSFUNCTION TYPE: ICD-10-CM

## 2024-10-17 PROCEDURE — 258N000003 HC RX IP 258 OP 636: Performed by: NURSE ANESTHETIST, CERTIFIED REGISTERED

## 2024-10-17 PROCEDURE — 710N000012 HC RECOVERY PHASE 2, PER MINUTE: Performed by: SURGERY

## 2024-10-17 PROCEDURE — 272N000001 HC OR GENERAL SUPPLY STERILE: Performed by: SURGERY

## 2024-10-17 PROCEDURE — 45378 DIAGNOSTIC COLONOSCOPY: CPT | Performed by: NURSE ANESTHETIST, CERTIFIED REGISTERED

## 2024-10-17 PROCEDURE — G0105 COLORECTAL SCRN; HI RISK IND: HCPCS | Performed by: SURGERY

## 2024-10-17 PROCEDURE — 370N000017 HC ANESTHESIA TECHNICAL FEE, PER MIN: Performed by: SURGERY

## 2024-10-17 PROCEDURE — 250N000009 HC RX 250: Performed by: NURSE ANESTHETIST, CERTIFIED REGISTERED

## 2024-10-17 PROCEDURE — 999N000141 HC STATISTIC PRE-PROCEDURE NURSING ASSESSMENT: Performed by: SURGERY

## 2024-10-17 PROCEDURE — 360N000075 HC SURGERY LEVEL 2, PER MIN: Performed by: SURGERY

## 2024-10-17 PROCEDURE — 250N000011 HC RX IP 250 OP 636: Performed by: NURSE ANESTHETIST, CERTIFIED REGISTERED

## 2024-10-17 RX ORDER — PROPOFOL 10 MG/ML
INJECTION, EMULSION INTRAVENOUS PRN
Status: DISCONTINUED | OUTPATIENT
Start: 2024-10-17 | End: 2024-10-17

## 2024-10-17 RX ORDER — SODIUM CHLORIDE, SODIUM LACTATE, POTASSIUM CHLORIDE, CALCIUM CHLORIDE 600; 310; 30; 20 MG/100ML; MG/100ML; MG/100ML; MG/100ML
INJECTION, SOLUTION INTRAVENOUS CONTINUOUS PRN
Status: DISCONTINUED | OUTPATIENT
Start: 2024-10-17 | End: 2024-10-17

## 2024-10-17 RX ORDER — GLYCOPYRROLATE 0.2 MG/ML
INJECTION, SOLUTION INTRAMUSCULAR; INTRAVENOUS PRN
Status: DISCONTINUED | OUTPATIENT
Start: 2024-10-17 | End: 2024-10-17

## 2024-10-17 RX ORDER — LIDOCAINE HYDROCHLORIDE 20 MG/ML
INJECTION, SOLUTION INFILTRATION; PERINEURAL PRN
Status: DISCONTINUED | OUTPATIENT
Start: 2024-10-17 | End: 2024-10-17

## 2024-10-17 RX ORDER — LIDOCAINE 40 MG/G
CREAM TOPICAL
Status: DISCONTINUED | OUTPATIENT
Start: 2024-10-17 | End: 2024-10-17 | Stop reason: HOSPADM

## 2024-10-17 RX ORDER — SODIUM CHLORIDE 9 MG/ML
INJECTION, SOLUTION INTRAVENOUS CONTINUOUS
Status: DISCONTINUED | OUTPATIENT
Start: 2024-10-17 | End: 2024-10-17 | Stop reason: HOSPADM

## 2024-10-17 RX ADMIN — PROPOFOL 100 MG: 10 INJECTION, EMULSION INTRAVENOUS at 11:23

## 2024-10-17 RX ADMIN — PROPOFOL 50 MG: 10 INJECTION, EMULSION INTRAVENOUS at 11:27

## 2024-10-17 RX ADMIN — PROPOFOL 50 MG: 10 INJECTION, EMULSION INTRAVENOUS at 11:33

## 2024-10-17 RX ADMIN — SODIUM CHLORIDE, POTASSIUM CHLORIDE, SODIUM LACTATE AND CALCIUM CHLORIDE: 600; 310; 30; 20 INJECTION, SOLUTION INTRAVENOUS at 11:20

## 2024-10-17 RX ADMIN — PROPOFOL 50 MG: 10 INJECTION, EMULSION INTRAVENOUS at 11:39

## 2024-10-17 RX ADMIN — PROPOFOL 50 MG: 10 INJECTION, EMULSION INTRAVENOUS at 11:25

## 2024-10-17 RX ADMIN — PROPOFOL 50 MG: 10 INJECTION, EMULSION INTRAVENOUS at 11:30

## 2024-10-17 RX ADMIN — GLYCOPYRROLATE 0.2 MG: 0.2 INJECTION, SOLUTION INTRAMUSCULAR; INTRAVENOUS at 11:35

## 2024-10-17 RX ADMIN — LIDOCAINE HYDROCHLORIDE 80 MG: 20 INJECTION, SOLUTION INFILTRATION; PERINEURAL at 11:23

## 2024-10-17 ASSESSMENT — ACTIVITIES OF DAILY LIVING (ADL)
ADLS_ACUITY_SCORE: 35

## 2024-10-17 NOTE — ANESTHESIA CARE TRANSFER NOTE
Patient: Zhen Warner    Procedure: Procedure(s):  COLONOSCOPY       Diagnosis: Family hx colonic polyps [Z83.719]  History of colonic polyps [Z86.0100]  Diagnosis Additional Information: No value filed.    Anesthesia Type:   MAC     Note:    Oropharynx: oropharynx clear of all foreign objects and spontaneously breathing  Level of Consciousness: drowsy  Oxygen Supplementation: room air    Independent Airway: airway patency satisfactory and stable  Dentition: dentition unchanged  Vital Signs Stable: post-procedure vital signs reviewed and stable  Report to RN Given: handoff report given  Patient transferred to: Phase II    Handoff Report: Identifed the Patient, Identified the Reponsible Provider, Reviewed the pertinent medical history, Discussed the surgical course, Reviewed Intra-OP anesthesia mangement and issues during anesthesia, Set expectations for post-procedure period and Allowed opportunity for questions and acknowledgement of understanding      Vitals:  Vitals Value Taken Time   BP     Temp     Pulse     Resp     SpO2         Electronically Signed By: ODALYS POWELL CRNA  October 17, 2024  11:44 AM

## 2024-10-17 NOTE — PLAN OF CARE
Patient and responsible adult given discharge instructions with no questions regarding instructions. Christiano score 19. Pain level 0/10.  Discharged from unit via ammbulating. Patient discharged to home.

## 2024-10-17 NOTE — ANESTHESIA POSTPROCEDURE EVALUATION
Patient: Zhen Warner    Procedure: Procedure(s):  COLONOSCOPY       Anesthesia Type:  MAC    Note:  Disposition: Outpatient   Postop Pain Control: Uneventful            Sign Out: Well controlled pain   PONV: No   Neuro/Psych: Uneventful            Sign Out: Acceptable/Baseline neuro status   Airway/Respiratory: Uneventful            Sign Out: Acceptable/Baseline resp. status   CV/Hemodynamics: Uneventful            Sign Out: Acceptable CV status; No obvious hypovolemia; No obvious fluid overload   Other NRE: NONE   DID A NON-ROUTINE EVENT OCCUR? No           Last vitals:  Vitals Value Taken Time   /83 10/17/24 1207   Temp     Pulse 73 10/17/24 1207   Resp     SpO2 98 % 10/17/24 1208   Vitals shown include unfiled device data.    Electronically Signed By: ODALYS Rodríguez CRNA  October 17, 2024  12:17 PM

## 2024-10-17 NOTE — OP NOTE
REPORT OF OPERATION  DATE OF PROCEDURE: 10/17/2024    PATIENT: Zhen Warner    SURGERY PERFORMED: Colonoscopy    PREOPERATIVE DIAGNOSIS: Screening colonoscopy    POSTOPERATIVE DIAGNOSIS:    Same   Normal colonoscopy   Diverticulosis was not identified.   Hemorrhoids were not  identified.    SURGEON: Eran Wang MD    ASSISTANTS: None    ANESTHESIA: Monitored Anesthesia Care    COMPLICATIONS: None apparent    ESTIMATED BLOOD LOSS: * No values recorded between 10/17/2024 11:29 AM and 10/17/2024 11:44 AM *    TRANSFUSIONS: None    TISSUE TO PATHOLOGY: None    FINDINGS: Normal colonoscopy.  Diverticulosis was not identified.  Hemorrhoids were not  identified.    INDICATIONS: This is a 64 year old male in need of a colonoscopy for Screening colonoscopy.  The patient will be taken to the endoscopy suite for that procedure.    DESCRIPTIONS OF PROCEDURE IN DETAIL: After consent was obtained the patient was taken to the endoscopy suite and placed in the left lateral decubitus position.  The patient was identified and the correct patient was confirmed.  Monitored Anesthesia Care was given.  A time out was performed verifying the correct patient and the correct procedure.  The entire operative team was in agreement.  All necessary equipment and supplies were in the room.    Rectal exam was performed and no lesions of the anal canal were noted.  The colonoscope was inserted into the anus and passed without difficulty to the cecum.  The cecum was identified by the ileocecal valve, the coalescence of the tinea and the appendiceal orifice.  Upon withdrawal all walls of the colon were visualized.  There were no polyps, masses or evidence of colitis seen.  Diverticulosis was not seen.  Upon reaching the rectum the scope was retroflexed and internal hemorrhoids were not  seen.  The scope was straightened back out and removed from the patient.  The patient was then taken to the recovery room in stable condition tolerating  the procedure well.      Prep: excellent    Withdrawal time was 8 minutes.    It is recommended that the patient have another colonoscopy in 10 years.

## 2024-11-07 ENCOUNTER — OFFICE VISIT (OUTPATIENT)
Dept: FAMILY MEDICINE | Facility: OTHER | Age: 64
End: 2024-11-07
Attending: FAMILY MEDICINE
Payer: COMMERCIAL

## 2024-11-07 VITALS
TEMPERATURE: 96.9 F | RESPIRATION RATE: 16 BRPM | HEART RATE: 71 BPM | OXYGEN SATURATION: 99 % | SYSTOLIC BLOOD PRESSURE: 104 MMHG | DIASTOLIC BLOOD PRESSURE: 78 MMHG | BODY MASS INDEX: 27.73 KG/M2 | WEIGHT: 171.8 LBS

## 2024-11-07 DIAGNOSIS — I10 BENIGN ESSENTIAL HTN: ICD-10-CM

## 2024-11-07 DIAGNOSIS — E78.5 DYSLIPIDEMIA: ICD-10-CM

## 2024-11-07 DIAGNOSIS — G47.9 SLEEP DISTURBANCES: ICD-10-CM

## 2024-11-07 DIAGNOSIS — Z76.89 ENCOUNTER TO ESTABLISH CARE: Primary | ICD-10-CM

## 2024-11-07 DIAGNOSIS — N52.9 ERECTILE DYSFUNCTION, UNSPECIFIED ERECTILE DYSFUNCTION TYPE: ICD-10-CM

## 2024-11-07 PROCEDURE — 99204 OFFICE O/P NEW MOD 45 MIN: CPT | Performed by: FAMILY MEDICINE

## 2024-11-07 PROCEDURE — G0463 HOSPITAL OUTPT CLINIC VISIT: HCPCS

## 2024-11-07 RX ORDER — SIMVASTATIN 40 MG
40 TABLET ORAL AT BEDTIME
COMMUNITY

## 2024-11-07 RX ORDER — ZOLPIDEM TARTRATE 10 MG/1
10 TABLET ORAL
COMMUNITY

## 2024-11-07 RX ORDER — LOSARTAN POTASSIUM 50 MG/1
50 TABLET ORAL DAILY
COMMUNITY

## 2024-11-07 RX ORDER — SILDENAFIL 100 MG/1
100 TABLET, FILM COATED ORAL DAILY PRN
COMMUNITY

## 2024-11-07 ASSESSMENT — PAIN SCALES - GENERAL: PAINLEVEL_OUTOF10: NO PAIN (0)

## 2024-11-07 NOTE — PROGRESS NOTES
Assessment & Plan     Encounter to establish care    Benign essential HTN  Controlled on meds.    Dyslipidemia  LDL at goal as of January on his statin    Erectile dysfunction, unspecified erectile dysfunction type  Works well.    Sleep disturbances  Discussed Ambien risks.  He using infrequently and feels benefits outweigh risks, so will continue.      Chart reviewed.  Will be due for updated labs in January - he's planning to schedule around that time.  Continue present meds, doesn't need refills at this time - will be due in January.      Aniceto Fontaine is a 64 year old, presenting for the following health issues:  Establish Care        11/7/2024     3:16 PM   Additional Questions   Roomed by Chidi Pereira lpn   Accompanied by self     History of Present Illness       Reason for visit:  New doctor meet   He is taking medications regularly.       63 yo male here to establish care.  Previous PCP Dr Lilian Orr - retired.  Today is just a meet and greet.  No concerns, doesn't need refills or labs today.      HLP - on statin - dx approx 10 yrs ago  BP - controlled - on meds x3-4 yrs  Viagra prn - works well  Ambien every few weeks on the weekend to help him sleep and catch up from the week.        Hyperlipidemia Follow-Up    Are you regularly taking any medication or supplement to lower your cholesterol?   Yes- simvastatin  Are you having muscle aches or other side effects that you think could be caused by your cholesterol lowering medication?  No    Hypertension Follow-up    Do you check your blood pressure regularly outside of the clinic? No   Are you following a low salt diet? Yes  Are your blood pressures ever more than 140 on the top number (systolic) OR more   than 90 on the bottom number (diastolic), for example 140/90? No              Objective    /78 (BP Location: Left arm, Patient Position: Sitting, Cuff Size: Adult Regular)   Pulse 71   Temp 96.9  F (36.1  C)   Resp 16   Wt 77.9  kg (171 lb 12.8 oz)   SpO2 99%   BMI 27.73 kg/m    Body mass index is 27.73 kg/m .  Physical Exam  Constitutional:       General: He is not in acute distress.     Appearance: Normal appearance.   HENT:      Head: Normocephalic and atraumatic.      Right Ear: Tympanic membrane normal.      Left Ear: Tympanic membrane normal.   Eyes:      Conjunctiva/sclera: Conjunctivae normal.      Pupils: Pupils are equal, round, and reactive to light.   Cardiovascular:      Rate and Rhythm: Normal rate and regular rhythm.      Heart sounds: Normal heart sounds. No murmur heard.  Pulmonary:      Effort: Pulmonary effort is normal.      Breath sounds: Normal breath sounds.   Abdominal:      General: Bowel sounds are normal.      Palpations: Abdomen is soft.   Musculoskeletal:      Right lower leg: No edema.      Left lower leg: No edema.   Lymphadenopathy:      Cervical: No cervical adenopathy.   Neurological:      Mental Status: He is alert and oriented to person, place, and time.          Time spend on date of service: 30 minutes including chart review, F2F H&P and chart update.          Signed Electronically by: JENNIFER LOPEZ DO

## 2024-11-11 ENCOUNTER — TRANSFERRED RECORDS (OUTPATIENT)
Dept: OTHER | Facility: HOSPITAL | Age: 64
End: 2024-11-11

## 2025-02-19 DIAGNOSIS — I10 BENIGN ESSENTIAL HTN: ICD-10-CM

## 2025-02-19 DIAGNOSIS — E78.5 DYSLIPIDEMIA: ICD-10-CM

## 2025-02-19 RX ORDER — LOSARTAN POTASSIUM 25 MG/1
25 TABLET ORAL DAILY
Qty: 30 TABLET | Refills: 0 | OUTPATIENT
Start: 2025-02-19

## 2025-02-19 RX ORDER — SIMVASTATIN 40 MG
40 TABLET ORAL DAILY
Qty: 30 TABLET | Refills: 0 | OUTPATIENT
Start: 2025-02-19

## 2025-02-19 NOTE — TELEPHONE ENCOUNTER
Cozaar      Last Written Prescription Date:  02/10/25  Last Fill Quantity: 30,   # refills: 5  Last Office Visit: 11/07/24  Future Office visit:    Next 5 appointments (look out 90 days)      Feb 25, 2025 1:30 PM  (Arrive by 1:15 PM)  Adult Preventative Visit with Arnoldo Britton DO  [facility] [place] ([facility] [place] ) [address] [place] [address]  [phone]             Zocor      Last Written Prescription Date:  02/10/25  Last Fill Quantity: 30,   # refills: 0  Last Office Visit: 11/07/24  Future Office visit:    Next 5 appointments (look out 90 days)      Feb 25, 2025 1:30 PM  (Arrive by 1:15 PM)  Adult Preventative Visit with Arnoldo Britton DO  [facility] [place] ([facility] [place] ) [address]  [place] [address]  [phone]

## 2025-02-25 ENCOUNTER — OFFICE VISIT (OUTPATIENT)
Dept: FAMILY MEDICINE | Facility: OTHER | Age: 65
End: 2025-02-25
Attending: FAMILY MEDICINE
Payer: COMMERCIAL

## 2025-02-25 VITALS
SYSTOLIC BLOOD PRESSURE: 127 MMHG | WEIGHT: 172.9 LBS | RESPIRATION RATE: 16 BRPM | BODY MASS INDEX: 27.79 KG/M2 | OXYGEN SATURATION: 99 % | DIASTOLIC BLOOD PRESSURE: 86 MMHG | HEART RATE: 77 BPM | TEMPERATURE: 97.1 F | HEIGHT: 66 IN

## 2025-02-25 DIAGNOSIS — R97.20 ELEVATED PROSTATE SPECIFIC ANTIGEN (PSA): ICD-10-CM

## 2025-02-25 DIAGNOSIS — Z11.59 NEED FOR HEPATITIS C SCREENING TEST: ICD-10-CM

## 2025-02-25 DIAGNOSIS — Z12.5 SCREENING PSA (PROSTATE SPECIFIC ANTIGEN): ICD-10-CM

## 2025-02-25 DIAGNOSIS — Z00.00 ENCOUNTER FOR MEDICARE ANNUAL WELLNESS EXAM: ICD-10-CM

## 2025-02-25 DIAGNOSIS — E78.5 DYSLIPIDEMIA: ICD-10-CM

## 2025-02-25 DIAGNOSIS — I10 BENIGN ESSENTIAL HTN: Primary | ICD-10-CM

## 2025-02-25 DIAGNOSIS — Z00.00 ENCOUNTER FOR MEDICARE ANNUAL WELLNESS EXAM: Primary | ICD-10-CM

## 2025-02-25 DIAGNOSIS — G47.09 OTHER INSOMNIA: ICD-10-CM

## 2025-02-25 DIAGNOSIS — N52.9 ERECTILE DYSFUNCTION, UNSPECIFIED ERECTILE DYSFUNCTION TYPE: ICD-10-CM

## 2025-02-25 LAB
ALBUMIN SERPL BCG-MCNC: 5 G/DL (ref 3.5–5.2)
ALP SERPL-CCNC: 49 U/L (ref 40–150)
ALT SERPL W P-5'-P-CCNC: 30 U/L (ref 0–70)
ANION GAP SERPL CALCULATED.3IONS-SCNC: 11 MMOL/L (ref 7–15)
AST SERPL W P-5'-P-CCNC: 21 U/L (ref 0–45)
BASOPHILS # BLD AUTO: 0 10E3/UL (ref 0–0.2)
BASOPHILS NFR BLD AUTO: 1 %
BILIRUB SERPL-MCNC: 0.7 MG/DL
BUN SERPL-MCNC: 16.3 MG/DL (ref 8–23)
CALCIUM SERPL-MCNC: 9.4 MG/DL (ref 8.8–10.4)
CHLORIDE SERPL-SCNC: 101 MMOL/L (ref 98–107)
CHOLEST SERPL-MCNC: 190 MG/DL
CREAT SERPL-MCNC: 1.44 MG/DL (ref 0.67–1.17)
CREAT UR-MCNC: 76 MG/DL
EGFRCR SERPLBLD CKD-EPI 2021: 54 ML/MIN/1.73M2
EOSINOPHIL # BLD AUTO: 0.1 10E3/UL (ref 0–0.7)
EOSINOPHIL NFR BLD AUTO: 2 %
ERYTHROCYTE [DISTWIDTH] IN BLOOD BY AUTOMATED COUNT: 12.6 % (ref 10–15)
FASTING STATUS PATIENT QL REPORTED: ABNORMAL
FASTING STATUS PATIENT QL REPORTED: ABNORMAL
GLUCOSE SERPL-MCNC: 104 MG/DL (ref 70–99)
HCO3 SERPL-SCNC: 26 MMOL/L (ref 22–29)
HCT VFR BLD AUTO: 40.1 % (ref 40–53)
HDLC SERPL-MCNC: 45 MG/DL
HGB BLD-MCNC: 13.9 G/DL (ref 13.3–17.7)
IMM GRANULOCYTES # BLD: 0 10E3/UL
IMM GRANULOCYTES NFR BLD: 0 %
LDLC SERPL CALC-MCNC: 108 MG/DL
LYMPHOCYTES # BLD AUTO: 1.4 10E3/UL (ref 0.8–5.3)
LYMPHOCYTES NFR BLD AUTO: 25 %
MAGNESIUM SERPL-MCNC: 2.3 MG/DL (ref 1.7–2.3)
MCH RBC QN AUTO: 32.2 PG (ref 26.5–33)
MCHC RBC AUTO-ENTMCNC: 34.7 G/DL (ref 31.5–36.5)
MCV RBC AUTO: 93 FL (ref 78–100)
MICROALBUMIN UR-MCNC: <12 MG/L
MICROALBUMIN/CREAT UR: NORMAL MG/G{CREAT}
MONOCYTES # BLD AUTO: 0.5 10E3/UL (ref 0–1.3)
MONOCYTES NFR BLD AUTO: 10 %
NEUTROPHILS # BLD AUTO: 3.4 10E3/UL (ref 1.6–8.3)
NEUTROPHILS NFR BLD AUTO: 63 %
NONHDLC SERPL-MCNC: 145 MG/DL
NRBC # BLD AUTO: 0 10E3/UL
NRBC BLD AUTO-RTO: 0 /100
PLATELET # BLD AUTO: 207 10E3/UL (ref 150–450)
POTASSIUM SERPL-SCNC: 4.1 MMOL/L (ref 3.4–5.3)
PROT SERPL-MCNC: 7.9 G/DL (ref 6.4–8.3)
PSA SERPL DL<=0.01 NG/ML-MCNC: 6.85 NG/ML (ref 0–4.5)
RBC # BLD AUTO: 4.32 10E6/UL (ref 4.4–5.9)
SODIUM SERPL-SCNC: 138 MMOL/L (ref 135–145)
TRIGL SERPL-MCNC: 185 MG/DL
WBC # BLD AUTO: 5.4 10E3/UL (ref 4–11)

## 2025-02-25 PROCEDURE — G0463 HOSPITAL OUTPT CLINIC VISIT: HCPCS

## 2025-02-25 PROCEDURE — 82043 UR ALBUMIN QUANTITATIVE: CPT | Mod: ZL | Performed by: FAMILY MEDICINE

## 2025-02-25 PROCEDURE — 36415 COLL VENOUS BLD VENIPUNCTURE: CPT | Mod: ZL | Performed by: FAMILY MEDICINE

## 2025-02-25 PROCEDURE — 83735 ASSAY OF MAGNESIUM: CPT | Mod: ZL | Performed by: FAMILY MEDICINE

## 2025-02-25 PROCEDURE — 85048 AUTOMATED LEUKOCYTE COUNT: CPT | Mod: ZL | Performed by: FAMILY MEDICINE

## 2025-02-25 PROCEDURE — G0103 PSA SCREENING: HCPCS | Mod: ZL | Performed by: FAMILY MEDICINE

## 2025-02-25 PROCEDURE — 86803 HEPATITIS C AB TEST: CPT | Mod: ZL | Performed by: FAMILY MEDICINE

## 2025-02-25 PROCEDURE — 84155 ASSAY OF PROTEIN SERUM: CPT | Mod: ZL | Performed by: FAMILY MEDICINE

## 2025-02-25 PROCEDURE — 82465 ASSAY BLD/SERUM CHOLESTEROL: CPT | Mod: ZL | Performed by: FAMILY MEDICINE

## 2025-02-25 PROCEDURE — 82374 ASSAY BLOOD CARBON DIOXIDE: CPT | Mod: ZL | Performed by: FAMILY MEDICINE

## 2025-02-25 PROCEDURE — 85004 AUTOMATED DIFF WBC COUNT: CPT | Mod: ZL | Performed by: FAMILY MEDICINE

## 2025-02-25 RX ORDER — SILDENAFIL 100 MG/1
100 TABLET, FILM COATED ORAL DAILY PRN
Qty: 30 TABLET | Refills: 0 | Status: SHIPPED | OUTPATIENT
Start: 2025-02-25

## 2025-02-25 RX ORDER — LOSARTAN POTASSIUM 50 MG/1
50 TABLET ORAL DAILY
Qty: 90 TABLET | Refills: 3 | Status: SHIPPED | OUTPATIENT
Start: 2025-02-25

## 2025-02-25 RX ORDER — SIMVASTATIN 40 MG
40 TABLET ORAL DAILY
Qty: 90 TABLET | Refills: 3 | Status: SHIPPED | OUTPATIENT
Start: 2025-02-25

## 2025-02-25 RX ORDER — ZOLPIDEM TARTRATE 10 MG/1
10 TABLET ORAL
Qty: 20 TABLET | Refills: 0 | Status: SHIPPED | OUTPATIENT
Start: 2025-02-25

## 2025-02-25 SDOH — HEALTH STABILITY: PHYSICAL HEALTH: ON AVERAGE, HOW MANY DAYS PER WEEK DO YOU ENGAGE IN MODERATE TO STRENUOUS EXERCISE (LIKE A BRISK WALK)?: 4 DAYS

## 2025-02-25 ASSESSMENT — SOCIAL DETERMINANTS OF HEALTH (SDOH): HOW OFTEN DO YOU GET TOGETHER WITH FRIENDS OR RELATIVES?: ONCE A WEEK

## 2025-02-25 NOTE — LETTER
March 4, 2025      Zhen HOLLIS Timmy  [address] [facility] [place] [address]        Dear ,    We are writing to inform you of your test results.    Please make an appointment with your provider to review or follow up on your test results.  Appointments can be made by calling [phone].    Cholesterol a little higher than ideal, I'm not sure it's worth adjusting your meds yet as long as there is no history of stents/heart attacks/strokes/etc.     You appear to have some mild kidney decline, common with aging/high blood pressure/etc.  Numbers appear stable compared to a couple years ago, so not worsening.     PSA (prostate lab) is a little elevated.  I discussed with our Urologist.  Recommendation is to recheck your PSA as a lab visit in about two months (see number above).  No sex/ejaculation, bicycle/motorcycle, or vigorous exercise for 2 days prior to lab test.  They will also want a urine sample at the same time.     Resulted Orders   Albumin Random Urine Quantitative with Creat Ratio   Result Value Ref Range    Creatinine Urine mg/dL 76.0 mg/dL      Comment:      The reference ranges have not been established in urine creatinine. The results should be integrated into the clinical context for interpretation.    Albumin Urine mg/L <12.0 mg/L      Comment:      The reference ranges have not been established in urine albumin. The results should be integrated into the clinical context for interpretation.    Albumin Urine mg/g Cr        Comment:      Unable to calculate, urine albumin and/or urine creatinine is outside detectable limits.  Microalbuminuria is defined as an albumin:creatinine ratio of 17 to 299 for males and 25 to 299 for females. A ratio of albumin:creatinine of 300 or higher is indicative of overt proteinuria.  Due to biologic variability, positive results should be confirmed by a second, first-morning random or 24-hour timed urine specimen. If there is discrepancy, a third specimen is  recommended. When 2 out of 3 results are in the microalbuminuria range, this is evidence for incipient nephropathy and warrants increased efforts at glucose control, blood pressure control, and institution of therapy with an angiotensin-converting-enzyme (ACE) inhibitor (if the patient can tolerate it).     Comprehensive metabolic panel   Result Value Ref Range    Sodium 138 135 - 145 mmol/L    Potassium 4.1 3.4 - 5.3 mmol/L    Carbon Dioxide (CO2) 26 22 - 29 mmol/L    Anion Gap 11 7 - 15 mmol/L    Urea Nitrogen 16.3 8.0 - 23.0 mg/dL    Creatinine 1.44 (H) 0.67 - 1.17 mg/dL    GFR Estimate 54 (L) >60 mL/min/1.73m2      Comment:      eGFR calculated using 2021 CKD-EPI equation.    Calcium 9.4 8.8 - 10.4 mg/dL    Chloride 101 98 - 107 mmol/L    Glucose 104 (H) 70 - 99 mg/dL    Alkaline Phosphatase 49 40 - 150 U/L    AST 21 0 - 45 U/L    ALT 30 0 - 70 U/L    Protein Total 7.9 6.4 - 8.3 g/dL    Albumin 5.0 3.5 - 5.2 g/dL    Bilirubin Total 0.7 <=1.2 mg/dL    Patient Fasting > 8hrs? Unknown    Magnesium   Result Value Ref Range    Magnesium 2.3 1.7 - 2.3 mg/dL   Lipid Profile   Result Value Ref Range    Cholesterol 190 <200 mg/dL    Triglycerides 185 (H) <150 mg/dL    Direct Measure HDL 45 >=40 mg/dL    LDL Cholesterol Calculated 108 (H) <100 mg/dL    Non HDL Cholesterol 145 (H) <130 mg/dL    Patient Fasting > 8hrs? Unknown     Narrative    Cholesterol  Desirable: < 200 mg/dL  Borderline High: 200 - 239 mg/dL  High: >= 240 mg/dL    Triglycerides  Normal: < 150 mg/dL  Borderline High: 150 - 199 mg/dL  High: 200-499 mg/dL  Very High: >= 500 mg/dL    Direct Measure HDL  Female: >= 50 mg/dL   Male: >= 40 mg/dL    LDL Cholesterol  Desirable: < 100 mg/dL  Above Desirable: 100 - 129 mg/dL   Borderline High: 130 - 159 mg/dL   High:  160 - 189 mg/dL   Very High: >= 190 mg/dL    Non HDL Cholesterol  Desirable: < 130 mg/dL  Above Desirable: 130 - 159 mg/dL  Borderline High: 160 - 189 mg/dL  High: 190 - 219 mg/dL  Very High: >= 220  mg/dL   Hepatitis C Screen Reflex to HCV RNA Quant and Genotype   Result Value Ref Range    Hepatitis C Antibody Nonreactive Nonreactive      Comment:      A nonreactive screening test result does not exclude the possibility of exposure to or infection with HCV. Nonreactive screening test results in individuals with prior exposure to HCV may be due to antibody levels below the limit of detection of this assay or lack of reactivity to the HCV antigens used in this assay. Patients with recent HCV infections (<3 months from time of exposure) may have false-negative HCV antibody results due to the time needed for seroconversion (average of 8 to 9 weeks).   PSA, screen   Result Value Ref Range    Prostate Specific Antigen Screen 6.85 (H) 0.00 - 4.50 ng/mL    Narrative    This result is obtained using the Roche Elecsys total PSA method on the ramon e601 immunoassay analyzer, which is an ultrasensitive method. Results obtained with different assay methods or kits cannot be used interchangeably.  This test is intended for initial prostate cancer screening. PSA values exceeding the age-specific limits are suspicious for prostate disease, but additional testing, such as prostate biopsy, is needed to diagnose prostate pathology. The American Cancer Society recommends annual examination with digital rectal examination and serum PSA beginning at age 50 and for men with a life expectancy of at least 10 years after detection of prostate cancer. For men in high-risk groups, such as  Americans or men with a first-degree relative diagnosed at a younger age, testing should begin at a younger age. It is generally recommended that information be provided to patients about the benefits and limitations of testing and treatment so they can make informed decisions.   CBC with platelets and differential   Result Value Ref Range    WBC Count 5.4 4.0 - 11.0 10e3/uL    RBC Count 4.32 (L) 4.40 - 5.90 10e6/uL    Hemoglobin 13.9 13.3 - 17.7  g/dL    Hematocrit 40.1 40.0 - 53.0 %    MCV 93 78 - 100 fL    MCH 32.2 26.5 - 33.0 pg    MCHC 34.7 31.5 - 36.5 g/dL    RDW 12.6 10.0 - 15.0 %    Platelet Count 207 150 - 450 10e3/uL    % Neutrophils 63 %    % Lymphocytes 25 %    % Monocytes 10 %    % Eosinophils 2 %    % Basophils 1 %    % Immature Granulocytes 0 %    NRBCs per 100 WBC 0 <1 /100    Absolute Neutrophils 3.4 1.6 - 8.3 10e3/uL    Absolute Lymphocytes 1.4 0.8 - 5.3 10e3/uL    Absolute Monocytes 0.5 0.0 - 1.3 10e3/uL    Absolute Eosinophils 0.1 0.0 - 0.7 10e3/uL    Absolute Basophils 0.0 0.0 - 0.2 10e3/uL    Absolute Immature Granulocytes 0.0 <=0.4 10e3/uL    Absolute NRBCs 0.0 10e3/uL       If you have any questions or concerns, please call the clinic at the number listed above.       Sincerely,      Arnoldo Britton, DO    Electronically signed

## 2025-02-25 NOTE — PROGRESS NOTES
Preventive Care Visit  [facility]  JENNIFER LOPEZ DO, Family Medicine  Feb 25, 2025  {Provider  Link to SmartSet :104838}    {PROVIDER CHARTING PREFERENCE:179777}    Aniceto Fontaine is a 64 year old, presenting for the following:  Physical        2/25/2025     1:17 PM   Additional Questions   Roomed by stefanie WILSON    Ambien maybe once a month if that.  Has maybe 20 pills left from his last fill a few months ago.  HTN controlled on meds  HLP on statin without recognized side effects  ED - Viagra maybe once a month      Health Care Directive  Patient does not have a Health Care Directive: Discussed advance care planning with patient; however, patient declined at this time.      2/25/2025   General Health   How would you rate your overall physical health? Good   Feel stress (tense, anxious, or unable to sleep) Only a little   (!) STRESS CONCERN      2/25/2025   Nutrition   Diet: Regular (no restrictions)         2/25/2025   Exercise   Days per week of moderate/strenous exercise 4 days     Minimal the last few months with the cold weather, otherwise was walking 3 miles        2/25/2025   Social Factors   Frequency of gathering with friends or relatives Once a week   Worry food won't last until get money to buy more No   Food not last or not have enough money for food? No   Do you have housing? (Housing is defined as stable permanent housing and does not include staying ouside in a car, in a tent, in an abandoned building, in an overnight shelter, or couch-surfing.) Yes   Are you worried about losing your housing? No   Lack of transportation? No   Unable to get utilities (heat,electricity)? No         2/25/2025   Fall Risk   Fallen 2 or more times in the past year? No   Trouble with walking or balance? No          2/25/2025   Activities of Daily Living- Home Safety   Needs help with the following daily activites None of the above   Safety concerns in the home None of the above         2/25/2025  "  Dental   Dentist two times every year? (!) NO     Eyes - 2-3 weeks ago  Dentist - few years ago - advised to schedule        2/25/2025   Hearing Screening   Hearing concerns? None of the above         2/25/2025   Driving Risk Screening   Patient/family members have concerns about driving No         2/25/2025   General Alertness/Fatigue Screening   Have you been more tired than usual lately? No         2/25/2025   Urinary Incontinence Screening   Bothered by leaking urine in past 6 months No      Very seldom wakes to urinate      Today's PHQ-2 Score:       2/25/2025     1:17 PM   PHQ-2 ( 1999 Pfizer)   Q1: Little interest or pleasure in doing things 0   Q2: Feeling down, depressed or hopeless 0   PHQ-2 Score 0    Q1: Little interest or pleasure in doing things Not at all   Q2: Feeling down, depressed or hopeless Not at all   PHQ-2 Score 0       Patient-reported           2/25/2025   Substance Use   Alcohol more than 3/day or more than 7/wk No   Do you have a current opioid prescription? No   How severe/bad is pain from 1 to 10? 1/10   Do you use any other substances recreationally? (!) CANNABIS PRODUCTS     Social History     Tobacco Use    Smoking status: Never    Smokeless tobacco: Never   Vaping Use    Vaping status: Never Used   Substance Use Topics    Alcohol use: Yes     Comment: No alcohol since beginning of year    Drug use: Yes     Types: Marijuana     Comment: marijuana on occasion           2/25/2025   AAA Screening   Family history of Abdominal Aortic Aneurysm (AAA)? No   Last PSA: No results found for: \"PSA\"  ASCVD Risk   The 10-year ASCVD risk score (Torri DK, et al., 2019) is: 16.1%    Values used to calculate the score:      Age: 64 years      Sex: Male      Is Non- : No      Diabetic: No      Tobacco smoker: No      Systolic Blood Pressure: 127 mmHg      Is BP treated: Yes      HDL Cholesterol: 39 mg/dL      Total Cholesterol: 169 mg/dL        Reviewed and updated " "as needed this visit by Provider   Tobacco  Allergies  Meds   Med Hx  Surg Hx  Fam Hx            Colonoscopy - Oct 2024    Current providers sharing in care for this patient include:  Patient Care Team:  rAnoldo Lopez DO as PCP - General (Family Medicine)  Fiordaliza Carbajal NP as Assigned Surgical Provider  Arnoldo Lopez DO as Assigned PCP    The following health maintenance items are reviewed in Epic and correct as of today:  Health Maintenance   Topic Date Due    GLUCOSE  Never done    HEPATITIS C SCREENING  Never done    BMP  01/30/2025    LIPID  01/30/2025    MEDICARE ANNUAL WELLNESS VISIT  01/30/2025    COVID-19 Vaccine (7 - 2024-25 season) 04/30/2025    FALL RISK ASSESSMENT  02/25/2026    ADVANCE CARE PLANNING  02/25/2030    DTAP/TDAP/TD IMMUNIZATION (3 - Td or Tdap) 04/15/2032    RSV VACCINE (1 - 1-dose 75+ series) 06/19/2033    COLORECTAL CANCER SCREENING  10/17/2034    PHQ-2 (once per calendar year)  Completed    INFLUENZA VACCINE  Completed    Pneumococcal Vaccine: 50+ Years  Completed    ZOSTER IMMUNIZATION  Completed    HPV IMMUNIZATION  Aged Out    MENINGITIS IMMUNIZATION  Aged Out       {ROS Picklists (Optional):655714}     Objective    Exam  /86 (BP Location: Left arm, Patient Position: Sitting, Cuff Size: Adult Regular)   Pulse 77   Temp 97.1  F (36.2  C) (Tympanic)   Resp 16   Ht 1.676 m (5' 6\")   Wt 78.4 kg (172 lb 14.4 oz)   SpO2 99%   BMI 27.91 kg/m     Estimated body mass index is 27.91 kg/m  as calculated from the following:    Height as of this encounter: 1.676 m (5' 6\").    Weight as of this encounter: 78.4 kg (172 lb 14.4 oz).    Physical Exam  {Exam Choices (Optional):795758}        2/25/2025   Mini Cog   Clock Draw Score 2 Normal   3 Item Recall 3 objects recalled   Mini Cog Total Score 5     {A Mini-Cog total score of 0-2 suggests the possibility of dementia, score of 3-5 suggests no dementia:514731}         Signed Electronically by: ARNOLDO LOPEZ DO  {Email " feedback regarding this note to [email or web address]   :[id number]}   Pupils: Pupils are equal, round, and reactive to light.   Neck:      Vascular: No carotid bruit.   Cardiovascular:      Rate and Rhythm: Normal rate and regular rhythm.      Heart sounds: Normal heart sounds. No murmur heard.  Pulmonary:      Effort: Pulmonary effort is normal.      Breath sounds: Normal breath sounds. No wheezing, rhonchi or rales.   Abdominal:      General: Bowel sounds are normal.      Palpations: Abdomen is soft.      Tenderness: There is no abdominal tenderness. There is no guarding.      Hernia: No hernia is present.   Musculoskeletal:         General: Normal range of motion.      Cervical back: Normal range of motion.      Right lower leg: No edema.      Left lower leg: No edema.   Lymphadenopathy:      Cervical: No cervical adenopathy.   Skin:     General: Skin is warm and dry.   Neurological:      Mental Status: He is alert and oriented to person, place, and time.      Cranial Nerves: No cranial nerve deficit.      Deep Tendon Reflexes: Reflexes normal.   Psychiatric:         Mood and Affect: Mood normal.         Behavior: Behavior normal.         Thought Content: Thought content normal.         Judgment: Judgment normal.               2/25/2025   Mini Cog   Clock Draw Score 2 Normal   3 Item Recall 3 objects recalled   Mini Cog Total Score 5              Signed Electronically by: JENNIFER LOPEZ DO

## 2025-02-26 DIAGNOSIS — I10 BENIGN ESSENTIAL HTN: ICD-10-CM

## 2025-02-26 LAB — HCV AB SERPL QL IA: NONREACTIVE

## 2025-02-26 RX ORDER — LOSARTAN POTASSIUM 25 MG/1
25 TABLET ORAL DAILY
Qty: 30 TABLET | Refills: 0 | OUTPATIENT
Start: 2025-02-26

## 2025-03-04 ENCOUNTER — TELEPHONE (OUTPATIENT)
Dept: FAMILY MEDICINE | Facility: OTHER | Age: 65
End: 2025-03-04

## 2025-03-04 NOTE — TELEPHONE ENCOUNTER
Spoke with patient regarding results. Writer let patient know that a letter was sent via mail with number to call for making a lab appointment. Patient did not want to make that appointment now, will call when closer to the 2 months.

## 2025-03-27 DIAGNOSIS — I10 BENIGN ESSENTIAL HTN: ICD-10-CM

## 2025-03-27 RX ORDER — LOSARTAN POTASSIUM 25 MG/1
25 TABLET ORAL DAILY
Qty: 30 TABLET | Refills: 0 | OUTPATIENT
Start: 2025-03-27

## 2025-03-27 NOTE — TELEPHONE ENCOUNTER
Losartan  Last Written Prescription Date: 2/25/25  Last Fill Quantity: 90 # of Refills: 3  Last Office Visit: 2/25/25

## 2025-04-24 ENCOUNTER — LAB (OUTPATIENT)
Dept: LAB | Facility: OTHER | Age: 65
End: 2025-04-24
Payer: COMMERCIAL

## 2025-04-24 DIAGNOSIS — R97.20 ELEVATED PROSTATE SPECIFIC ANTIGEN (PSA): ICD-10-CM

## 2025-04-24 LAB
ALBUMIN UR-MCNC: NEGATIVE MG/DL
APPEARANCE UR: CLEAR
BILIRUB UR QL STRIP: NEGATIVE
COLOR UR AUTO: YELLOW
GLUCOSE UR STRIP-MCNC: NEGATIVE MG/DL
HGB UR QL STRIP: NEGATIVE
KETONES UR STRIP-MCNC: NEGATIVE MG/DL
LEUKOCYTE ESTERASE UR QL STRIP: NEGATIVE
MUCOUS THREADS #/AREA URNS LPF: PRESENT /LPF
NITRATE UR QL: NEGATIVE
PH UR STRIP: 5.5 [PH] (ref 4.7–8)
PSA SERPL DL<=0.01 NG/ML-MCNC: 6.53 NG/ML (ref 0–4.5)
RBC URINE: <1 /HPF
SP GR UR STRIP: 1.01 (ref 1–1.03)
SQUAMOUS EPITHELIAL: 0 /HPF
UROBILINOGEN UR STRIP-MCNC: NORMAL MG/DL
WBC URINE: 1 /HPF

## 2025-04-24 PROCEDURE — 81003 URINALYSIS AUTO W/O SCOPE: CPT | Mod: ZL

## 2025-04-24 PROCEDURE — 36415 COLL VENOUS BLD VENIPUNCTURE: CPT | Mod: ZL

## 2025-04-24 PROCEDURE — 84153 ASSAY OF PSA TOTAL: CPT | Mod: ZL

## 2025-04-28 DIAGNOSIS — I10 BENIGN ESSENTIAL HTN: ICD-10-CM

## 2025-04-28 RX ORDER — LOSARTAN POTASSIUM 25 MG/1
25 TABLET ORAL DAILY
Qty: 30 TABLET | Refills: 0 | OUTPATIENT
Start: 2025-04-28

## 2025-05-05 ENCOUNTER — MYC MEDICAL ADVICE (OUTPATIENT)
Dept: FAMILY MEDICINE | Facility: OTHER | Age: 65
End: 2025-05-05

## 2025-05-07 NOTE — PROGRESS NOTES
4/24 PSA lab result note states:  UA okay.   PSA still elevated.     Placing a referral for you to see Urology to discuss potential further evaluation.   4/25 Urology referral placed.  Patient scheduled 8/6 Urology on 5/6.  No outreach to patient.  Emma Reich, JESSICA  Care Coordination

## 2025-07-30 ENCOUNTER — TELEPHONE (OUTPATIENT)
Dept: FAMILY MEDICINE | Facility: OTHER | Age: 65
End: 2025-07-30

## 2025-07-30 NOTE — TELEPHONE ENCOUNTER
Attempt # 1  Outcome: Left Message   Comment: Left message for patient to call back to schedule annual physical per quality measures.

## 2025-08-06 ENCOUNTER — LAB (OUTPATIENT)
Dept: LAB | Facility: OTHER | Age: 65
End: 2025-08-06
Attending: UROLOGY
Payer: COMMERCIAL

## 2025-08-06 ENCOUNTER — OFFICE VISIT (OUTPATIENT)
Dept: UROLOGY | Facility: OTHER | Age: 65
End: 2025-08-06
Attending: UROLOGY
Payer: COMMERCIAL

## 2025-08-06 VITALS
SYSTOLIC BLOOD PRESSURE: 102 MMHG | RESPIRATION RATE: 18 BRPM | BODY MASS INDEX: 27.78 KG/M2 | HEIGHT: 66 IN | HEART RATE: 85 BPM | DIASTOLIC BLOOD PRESSURE: 79 MMHG | WEIGHT: 172.84 LBS | OXYGEN SATURATION: 97 %

## 2025-08-06 DIAGNOSIS — R97.20 ELEVATED PROSTATE SPECIFIC ANTIGEN (PSA): ICD-10-CM

## 2025-08-06 LAB
ALBUMIN UR-MCNC: 20 MG/DL
APPEARANCE UR: CLEAR
BILIRUB UR QL STRIP: NEGATIVE
COLOR UR AUTO: YELLOW
GLUCOSE UR STRIP-MCNC: NEGATIVE MG/DL
HGB UR QL STRIP: NEGATIVE
HYALINE CASTS: 6 /LPF
KETONES UR STRIP-MCNC: NEGATIVE MG/DL
LEUKOCYTE ESTERASE UR QL STRIP: NEGATIVE
MUCOUS THREADS #/AREA URNS LPF: PRESENT /LPF
NITRATE UR QL: NEGATIVE
PH UR STRIP: 6 [PH] (ref 4.7–8)
RBC URINE: 0 /HPF
SP GR UR STRIP: 1.01 (ref 1–1.03)
SQUAMOUS EPITHELIAL: 0 /HPF
UROBILINOGEN UR STRIP-MCNC: NORMAL MG/DL
WBC URINE: 2 /HPF

## 2025-08-06 PROCEDURE — G0463 HOSPITAL OUTPT CLINIC VISIT: HCPCS

## 2025-08-06 PROCEDURE — 51798 US URINE CAPACITY MEASURE: CPT | Performed by: UROLOGY

## 2025-08-06 PROCEDURE — 81001 URINALYSIS AUTO W/SCOPE: CPT | Mod: ZL

## 2025-08-06 ASSESSMENT — PAIN SCALES - GENERAL: PAINLEVEL_OUTOF10: NO PAIN (0)

## 2025-08-19 ENCOUNTER — HOSPITAL ENCOUNTER (OUTPATIENT)
Dept: MRI IMAGING | Facility: OTHER | Age: 65
Discharge: HOME | End: 2025-08-19
Attending: UROLOGY
Payer: COMMERCIAL

## 2025-08-19 DIAGNOSIS — R97.20 ELEVATED PROSTATE SPECIFIC ANTIGEN (PSA): ICD-10-CM

## 2025-08-19 PROCEDURE — 255N000002 HC RX 255 OP 636: Performed by: UROLOGY

## 2025-08-19 PROCEDURE — 76377 3D RENDER W/INTRP POSTPROCES: CPT

## 2025-08-19 PROCEDURE — A9575 INJ GADOTERATE MEGLUMI 0.1ML: HCPCS | Performed by: UROLOGY

## 2025-08-19 RX ORDER — GADOTERATE MEGLUMINE 376.9 MG/ML
20 INJECTION INTRAVENOUS ONCE
Status: COMPLETED | OUTPATIENT
Start: 2025-08-19 | End: 2025-08-19

## 2025-08-19 RX ADMIN — GADOTERATE MEGLUMINE 16 ML: 376.9 INJECTION INTRAVENOUS at 14:14

## (undated) DEVICE — CONNECTOR ERBEFLO 2 PORT 20325-215

## (undated) DEVICE — CANISTER SUCTION MEDI-VAC GUARDIAN 2000ML 90D 65651-220

## (undated) DEVICE — SOL WATER IRRIG 1000ML BOTTLE 2F7114

## (undated) DEVICE — TUBING SUCTION 20FT N620A

## (undated) DEVICE — SUCTION MANIFOLD NEPTUNE 2 SYS 1 PORT 702-025-000

## (undated) RX ORDER — PROPOFOL 10 MG/ML
INJECTION, EMULSION INTRAVENOUS
Status: DISPENSED
Start: 2024-10-17

## (undated) RX ORDER — GLYCOPYRROLATE 0.2 MG/ML
INJECTION, SOLUTION INTRAMUSCULAR; INTRAVENOUS
Status: DISPENSED
Start: 2024-10-17